# Patient Record
Sex: MALE | Race: WHITE | NOT HISPANIC OR LATINO | Employment: OTHER | URBAN - METROPOLITAN AREA
[De-identification: names, ages, dates, MRNs, and addresses within clinical notes are randomized per-mention and may not be internally consistent; named-entity substitution may affect disease eponyms.]

---

## 2017-04-06 ENCOUNTER — OFFICE VISIT - RIVER FALLS (OUTPATIENT)
Dept: FAMILY MEDICINE | Facility: CLINIC | Age: 62
End: 2017-04-06

## 2017-06-26 ENCOUNTER — AMBULATORY - RIVER FALLS (OUTPATIENT)
Dept: FAMILY MEDICINE | Facility: CLINIC | Age: 62
End: 2017-06-26

## 2017-06-27 LAB — PSA SERPL-MCNC: <0.1 NG/ML

## 2017-07-27 ENCOUNTER — RECORDS - HEALTHEAST (OUTPATIENT)
Dept: LAB | Facility: CLINIC | Age: 62
End: 2017-07-27

## 2017-07-27 LAB
CHOLEST SERPL-MCNC: 174 MG/DL
FASTING STATUS PATIENT QL REPORTED: YES
HDLC SERPL-MCNC: 65 MG/DL
LDLC SERPL CALC-MCNC: 101 MG/DL
TRIGL SERPL-MCNC: 39 MG/DL

## 2017-09-26 ENCOUNTER — AMBULATORY - RIVER FALLS (OUTPATIENT)
Dept: FAMILY MEDICINE | Facility: CLINIC | Age: 62
End: 2017-09-26

## 2017-09-26 ENCOUNTER — COMMUNICATION - RIVER FALLS (OUTPATIENT)
Dept: FAMILY MEDICINE | Facility: CLINIC | Age: 62
End: 2017-09-26

## 2017-09-27 LAB — PSA SERPL-MCNC: <0.1 NG/ML

## 2017-10-16 ENCOUNTER — OFFICE VISIT - RIVER FALLS (OUTPATIENT)
Dept: FAMILY MEDICINE | Facility: CLINIC | Age: 62
End: 2017-10-16

## 2017-11-30 ASSESSMENT — MIFFLIN-ST. JEOR: SCORE: 1464.39

## 2017-12-03 ENCOUNTER — ANESTHESIA - HEALTHEAST (OUTPATIENT)
Dept: SURGERY | Facility: CLINIC | Age: 62
End: 2017-12-03

## 2017-12-04 ENCOUNTER — SURGERY - HEALTHEAST (OUTPATIENT)
Dept: SURGERY | Facility: CLINIC | Age: 62
End: 2017-12-04

## 2018-04-26 ENCOUNTER — AMBULATORY - RIVER FALLS (OUTPATIENT)
Dept: FAMILY MEDICINE | Facility: CLINIC | Age: 63
End: 2018-04-26

## 2018-04-27 LAB — PSA SERPL-MCNC: <0.1 NG/ML

## 2018-08-28 ENCOUNTER — RECORDS - HEALTHEAST (OUTPATIENT)
Dept: LAB | Facility: CLINIC | Age: 63
End: 2018-08-28

## 2018-08-28 LAB
ALBUMIN SERPL-MCNC: 3.9 G/DL (ref 3.5–5)
ALP SERPL-CCNC: 91 U/L (ref 45–120)
ALT SERPL W P-5'-P-CCNC: 20 U/L (ref 0–45)
ANION GAP SERPL CALCULATED.3IONS-SCNC: 8 MMOL/L (ref 5–18)
AST SERPL W P-5'-P-CCNC: 21 U/L (ref 0–40)
BILIRUB SERPL-MCNC: 0.7 MG/DL (ref 0–1)
BUN SERPL-MCNC: 11 MG/DL (ref 8–22)
CALCIUM SERPL-MCNC: 10.1 MG/DL (ref 8.5–10.5)
CHLORIDE BLD-SCNC: 101 MMOL/L (ref 98–107)
CHOLEST SERPL-MCNC: 171 MG/DL
CO2 SERPL-SCNC: 27 MMOL/L (ref 22–31)
CREAT SERPL-MCNC: 0.72 MG/DL (ref 0.7–1.3)
FASTING STATUS PATIENT QL REPORTED: YES
GFR SERPL CREATININE-BSD FRML MDRD: >60 ML/MIN/1.73M2
GLUCOSE BLD-MCNC: 100 MG/DL (ref 70–125)
HDLC SERPL-MCNC: 69 MG/DL
LDLC SERPL CALC-MCNC: 91 MG/DL
POTASSIUM BLD-SCNC: 4.9 MMOL/L (ref 3.5–5)
PROT SERPL-MCNC: 7.9 G/DL (ref 6–8)
SODIUM SERPL-SCNC: 136 MMOL/L (ref 136–145)
TRIGL SERPL-MCNC: 57 MG/DL

## 2018-09-24 ENCOUNTER — OFFICE VISIT - RIVER FALLS (OUTPATIENT)
Dept: FAMILY MEDICINE | Facility: CLINIC | Age: 63
End: 2018-09-24

## 2018-09-25 LAB — PSA SERPL-MCNC: <0.1 NG/ML

## 2018-10-01 ENCOUNTER — COMMUNICATION - RIVER FALLS (OUTPATIENT)
Dept: FAMILY MEDICINE | Facility: CLINIC | Age: 63
End: 2018-10-01

## 2019-04-29 ENCOUNTER — AMBULATORY - RIVER FALLS (OUTPATIENT)
Dept: FAMILY MEDICINE | Facility: CLINIC | Age: 64
End: 2019-04-29

## 2019-04-30 LAB — PSA SERPL-MCNC: <0.1 NG/ML

## 2019-09-10 ENCOUNTER — RECORDS - HEALTHEAST (OUTPATIENT)
Dept: LAB | Facility: CLINIC | Age: 64
End: 2019-09-10

## 2019-09-10 LAB
ALBUMIN SERPL-MCNC: 3.8 G/DL (ref 3.5–5)
ALP SERPL-CCNC: 78 U/L (ref 45–120)
ALT SERPL W P-5'-P-CCNC: 22 U/L (ref 0–45)
ANION GAP SERPL CALCULATED.3IONS-SCNC: 8 MMOL/L (ref 5–18)
AST SERPL W P-5'-P-CCNC: 20 U/L (ref 0–40)
BILIRUB SERPL-MCNC: 0.5 MG/DL (ref 0–1)
BUN SERPL-MCNC: 11 MG/DL (ref 8–22)
CALCIUM SERPL-MCNC: 9.7 MG/DL (ref 8.5–10.5)
CHLORIDE BLD-SCNC: 101 MMOL/L (ref 98–107)
CHOLEST SERPL-MCNC: 176 MG/DL
CO2 SERPL-SCNC: 27 MMOL/L (ref 22–31)
CREAT SERPL-MCNC: 0.77 MG/DL (ref 0.7–1.3)
FASTING STATUS PATIENT QL REPORTED: YES
GFR SERPL CREATININE-BSD FRML MDRD: >60 ML/MIN/1.73M2
GLUCOSE BLD-MCNC: 105 MG/DL (ref 70–125)
HDLC SERPL-MCNC: 75 MG/DL
LDLC SERPL CALC-MCNC: 87 MG/DL
POTASSIUM BLD-SCNC: 4.5 MMOL/L (ref 3.5–5)
PROT SERPL-MCNC: 7.7 G/DL (ref 6–8)
SODIUM SERPL-SCNC: 136 MMOL/L (ref 136–145)
TRIGL SERPL-MCNC: 68 MG/DL

## 2019-09-24 ENCOUNTER — AMBULATORY - RIVER FALLS (OUTPATIENT)
Dept: FAMILY MEDICINE | Facility: CLINIC | Age: 64
End: 2019-09-24

## 2019-09-25 LAB — PSA SERPL-MCNC: <0.1 NG/ML

## 2019-10-07 ENCOUNTER — OFFICE VISIT - RIVER FALLS (OUTPATIENT)
Dept: FAMILY MEDICINE | Facility: CLINIC | Age: 64
End: 2019-10-07

## 2020-04-15 ENCOUNTER — AMBULATORY - RIVER FALLS (OUTPATIENT)
Dept: FAMILY MEDICINE | Facility: CLINIC | Age: 65
End: 2020-04-15

## 2020-04-16 LAB — PSA SERPL-MCNC: <0.1 NG/ML

## 2020-10-08 ENCOUNTER — AMBULATORY - RIVER FALLS (OUTPATIENT)
Dept: FAMILY MEDICINE | Facility: CLINIC | Age: 65
End: 2020-10-08

## 2020-10-09 LAB — PSA SERPL-MCNC: <0.1 NG/ML

## 2020-10-13 ENCOUNTER — RECORDS - HEALTHEAST (OUTPATIENT)
Dept: LAB | Facility: CLINIC | Age: 65
End: 2020-10-13

## 2020-10-13 LAB
ALBUMIN SERPL-MCNC: 3.8 G/DL (ref 3.5–5)
ALP SERPL-CCNC: 83 U/L (ref 45–120)
ALT SERPL W P-5'-P-CCNC: 16 U/L (ref 0–45)
ANION GAP SERPL CALCULATED.3IONS-SCNC: 7 MMOL/L (ref 5–18)
AST SERPL W P-5'-P-CCNC: 17 U/L (ref 0–40)
BILIRUB SERPL-MCNC: 0.5 MG/DL (ref 0–1)
BUN SERPL-MCNC: 11 MG/DL (ref 8–22)
CALCIUM SERPL-MCNC: 9.3 MG/DL (ref 8.5–10.5)
CHLORIDE BLD-SCNC: 101 MMOL/L (ref 98–107)
CHOLEST SERPL-MCNC: 173 MG/DL
CO2 SERPL-SCNC: 28 MMOL/L (ref 22–31)
CREAT SERPL-MCNC: 0.77 MG/DL (ref 0.7–1.3)
FASTING STATUS PATIENT QL REPORTED: YES
GFR SERPL CREATININE-BSD FRML MDRD: >60 ML/MIN/1.73M2
GLUCOSE BLD-MCNC: 105 MG/DL (ref 70–125)
HDLC SERPL-MCNC: 58 MG/DL
LDLC SERPL CALC-MCNC: 96 MG/DL
POTASSIUM BLD-SCNC: 4.3 MMOL/L (ref 3.5–5)
PROT SERPL-MCNC: 7.6 G/DL (ref 6–8)
SODIUM SERPL-SCNC: 136 MMOL/L (ref 136–145)
TRIGL SERPL-MCNC: 97 MG/DL

## 2020-11-02 ENCOUNTER — OFFICE VISIT - RIVER FALLS (OUTPATIENT)
Dept: FAMILY MEDICINE | Facility: CLINIC | Age: 65
End: 2020-11-02

## 2020-11-06 ENCOUNTER — OFFICE VISIT - RIVER FALLS (OUTPATIENT)
Dept: FAMILY MEDICINE | Facility: CLINIC | Age: 65
End: 2020-11-06

## 2020-11-06 ENCOUNTER — AMBULATORY - RIVER FALLS (OUTPATIENT)
Dept: FAMILY MEDICINE | Facility: CLINIC | Age: 65
End: 2020-11-06

## 2021-05-30 ENCOUNTER — RECORDS - HEALTHEAST (OUTPATIENT)
Dept: ADMINISTRATIVE | Facility: CLINIC | Age: 66
End: 2021-05-30

## 2021-05-31 VITALS — WEIGHT: 160 LBS | HEIGHT: 67 IN | BODY MASS INDEX: 25.11 KG/M2

## 2021-06-14 NOTE — ANESTHESIA PREPROCEDURE EVALUATION
Anesthesia Evaluation      Patient summary reviewed   No history of anesthetic complications     Airway   Mallampati: II  Neck ROM: full   Pulmonary - negative ROS and normal exam                          Cardiovascular - negative ROS  Exercise tolerance: > or = 4 METS  Rhythm: regular  Rate: normal,         Neuro/Psych - negative ROS     Endo/Other - negative ROS      GI/Hepatic/Renal - negative ROS           Dental - normal exam                        Anesthesia Plan  Planned anesthetic: general LMA    ASA 1   Induction: intravenous   Anesthetic plan and risks discussed with: patient    Post-op plan: routine recovery

## 2021-06-14 NOTE — ANESTHESIA CARE TRANSFER NOTE
Last vitals:   Vitals:    12/04/17 1305   BP: 129/74   Pulse: 88   Resp: 14   Temp: 36.6  C (97.8  F)   SpO2: 99%     Patient's level of consciousness is drowsy  Spontaneous respirations: yes  Maintains airway independently: yes  Dentition unchanged: yes  Oropharynx: oropharynx clear of all foreign objects    QCDR Measures:  ASA# 20 - Surgical Safety Checklist: WHO surgical safety checklist completed prior to induction  PQRS# 430 - Adult PONV Prevention: 4558F - Pt received => 2 anti-emetic agents (different classes) preop & intraop  ASA# 8 - Peds PONV Prevention: NA - Not pediatric patient, not GA or 2 or more risk factors NOT present  PQRS# 424 - Stacia-op Temp Management: 4559F - At least one body temp DOCUMENTED => 35.5C or 95.9F within required timeframe  PQRS# 426 - PACU Transfer Protocol: - Transfer of care checklist used  ASA# 14 - Acute Post-op Pain: ASA14B - Patient did NOT experience pain >= 7 out of 10

## 2021-06-14 NOTE — ANESTHESIA POSTPROCEDURE EVALUATION
Patient: Juan Valadez  REPAIR LEFT INGUINAL HERNIA WITH MESH, REPAIR UMBILICAL HERNIA  Anesthesia type: general    Patient location: PACU  Last vitals:   Vitals:    12/04/17 1440   BP: 142/82   Pulse: 60   Resp: 16   Temp: 36.8  C (98.2  F)   SpO2: 98%     Post vital signs: stable  Level of consciousness: awake and responds to simple questions  Post-anesthesia pain: pain controlled  Post-anesthesia nausea and vomiting: no  Pulmonary: unassisted, return to baseline  Cardiovascular: stable and blood pressure at baseline  Hydration: adequate  Anesthetic events: no    QCDR Measures:  ASA# 11 - Stacia-op Cardiac Arrest: ASA11B - Patient did NOT experience unanticipated cardiac arrest  ASA# 12 - Stacia-op Mortality Rate: ASA12B - Patient did NOT die  ASA# 13 - PACU Re-Intubation Rate: ASA13B - Patient did NOT require a new airway mgmt  ASA# 10 - Composite Anes Safety: ASA10A - No serious adverse event    Additional Notes:

## 2021-09-30 ENCOUNTER — OFFICE VISIT - RIVER FALLS (OUTPATIENT)
Dept: FAMILY MEDICINE | Facility: CLINIC | Age: 66
End: 2021-09-30

## 2021-09-30 ASSESSMENT — MIFFLIN-ST. JEOR: SCORE: 1456.21

## 2021-10-01 ENCOUNTER — COMMUNICATION - RIVER FALLS (OUTPATIENT)
Dept: FAMILY MEDICINE | Facility: CLINIC | Age: 66
End: 2021-10-01

## 2021-10-01 LAB
CHOLEST SERPL-MCNC: 195 MG/DL
CHOLEST/HDLC SERPL: 2.6 {RATIO}
GLUCOSE BLD-MCNC: 104 MG/DL (ref 65–99)
HDLC SERPL-MCNC: 74 MG/DL
LDLC SERPL CALC-MCNC: 103 MG/DL
NONHDLC SERPL-MCNC: 121 MG/DL
TRIGL SERPL-MCNC: 88 MG/DL

## 2021-12-15 ENCOUNTER — OFFICE VISIT - RIVER FALLS (OUTPATIENT)
Dept: FAMILY MEDICINE | Facility: CLINIC | Age: 66
End: 2021-12-15

## 2021-12-15 ASSESSMENT — MIFFLIN-ST. JEOR: SCORE: 1466.64

## 2022-02-11 VITALS
BODY MASS INDEX: 26.16 KG/M2 | SYSTOLIC BLOOD PRESSURE: 143 MMHG | TEMPERATURE: 96.4 F | HEIGHT: 66 IN | HEART RATE: 55 BPM | DIASTOLIC BLOOD PRESSURE: 81 MMHG | WEIGHT: 162.8 LBS

## 2022-02-11 VITALS — SYSTOLIC BLOOD PRESSURE: 130 MMHG | HEART RATE: 67 BPM | DIASTOLIC BLOOD PRESSURE: 76 MMHG

## 2022-02-11 VITALS
DIASTOLIC BLOOD PRESSURE: 75 MMHG | BODY MASS INDEX: 26.53 KG/M2 | WEIGHT: 165.1 LBS | HEART RATE: 73 BPM | HEIGHT: 66 IN | TEMPERATURE: 96.9 F | SYSTOLIC BLOOD PRESSURE: 136 MMHG

## 2022-02-16 NOTE — NURSING NOTE
Depression Screening Entered On:  9/30/2021 12:52 PM CDT    Performed On:  9/30/2021 12:52 PM CDT by Bradley STONE, Maxine               Depression Screening   Little Interest - Pleasure in Activities :   Not at all   Feeling Down, Depressed, Hopeless :   Not at all   Initial Depression Screen Score :   0 Score   Poor Appetite or Overeating :   Not at all   Trouble Falling or Staying Asleep :   Several days   Feeling Tired or Little Energy :   Not at all   Feeling Bad About Yourself :   Not at all   Trouble Concentrating :   Several days   Moving or Speaking Slowly :   Not at all   Thoughts Better Off Dead or Hurting Self :   Not at all   Difficulty at Work, Home, Getting Along :   Not difficult at all   Detailed Depression Screen Score :   2    Total Depression Screen Score :   2    Maxine Huerta MA - 9/30/2021 12:52 PM CDT

## 2022-02-16 NOTE — NURSING NOTE
Comprehensive Intake Entered On:  9/30/2021 11:13 AM CDT    Performed On:  9/30/2021 11:07 AM CDT by Maxine Huerta MA               Summary   Chief Complaint :   establish care.  also discuss periodic anxiety.  flu shot also.   Weight Measured :   162.8 lb(Converted to: 162 lb 13 oz, 73.845 kg)    Height Measured :   66 in(Converted to: 5 ft 6 in, 167.64 cm)    Body Mass Index :   26.27 kg/m2 (HI)    Body Surface Area :   1.85 m2   Systolic Blood Pressure :   143 mmHg (HI)    Diastolic Blood Pressure :   81 mmHg (HI)    Mean Arterial Pressure :   102 mmHg   Peripheral Pulse Rate :   55 bpm (LOW)    BP Site :   Right arm   Pulse Site :   Radial artery   BP Method :   Electronic   HR Method :   Electronic   Temperature Tympanic :   96.4 DegF(Converted to: 35.8 DegC)  (LOW)    Maxine Huerta MA - 9/30/2021 11:07 AM CDT   Health Status   Allergies Verified? :   Yes   Medication History Verified? :   Yes   Medical History Verified? :   Yes   Pre-Visit Planning Status :   Completed   Maxine Huerta MA - 9/30/2021 11:07 AM CDT   Consents   Consent for Immunization Exchange :   Consent Granted   Consent for Immunizations to Providers :   Consent Granted   Maxine Huerta MA - 9/30/2021 11:07 AM CDT   Meds / Allergies   (As Of: 9/30/2021 11:13:34 AM CDT)   Allergies (Active)   No Known Medication Allergies  Estimated Onset Date:   Unspecified ; Created By:   Ana Maria Webster CMA; Reaction Status:   Active ; Category:   Drug ; Substance:   No Known Medication Allergies ; Type:   Allergy ; Updated By:   Ana Maria Webster CMA; Reviewed Date:   11/6/2020 12:08 PM CST        Medication List   (As Of: 9/30/2021 11:13:34 AM CDT)   Home Meds    ascorbic acid  :   ascorbic acid ; Status:   Documented ; Ordered As Mnemonic:   Vitamin C ; Simple Display Line:   daily, 0 Refill(s) ; Catalog Code:   ascorbic acid ; Order Dt/Tm:   11/6/2020 12:19:17 PM CST          atorvastatin  :   atorvastatin ; Status:   Documented ; Ordered As  Mnemonic:   atorvastatin 40 mg oral tablet ; Simple Display Line:   0 Refill(s) ; Catalog Code:   atorvastatin ; Order Dt/Tm:   9/29/2021 3:45:01 PM CDT          cholecalciferol  :   cholecalciferol ; Status:   Documented ; Ordered As Mnemonic:   Vitamin D3 ; Simple Display Line:   daily, 0 Refill(s) ; Catalog Code:   cholecalciferol ; Order Dt/Tm:   9/30/2021 11:11:27 AM CDT          multivitamin with minerals  :   multivitamin with minerals ; Status:   Documented ; Ordered As Mnemonic:   Daily Multi oral tablet ; Simple Display Line:   Oral, daily, 0 Refill(s) ; Catalog Code:   multivitamin with minerals ; Order Dt/Tm:   11/6/2020 12:18:58 PM CST          sildenafil  :   sildenafil ; Status:   Documented ; Ordered As Mnemonic:   sildenafil 20 mg oral tablet ; Simple Display Line:   20 mg, 1 tab(s), Oral, tid, 0 Refill(s) ; Catalog Code:   sildenafil ; Order Dt/Tm:   9/30/2021 11:11:06 AM CDT          ubiquinone  :   ubiquinone ; Status:   Documented ; Ordered As Mnemonic:   Co Q-10 ; Simple Display Line:   100 mg, Oral, daily, 0 Refill(s) ; Catalog Code:   ubiquinone ; Order Dt/Tm:   11/6/2020 12:19:05 PM CST          zinc sulfate  :   zinc sulfate ; Status:   Documented ; Ordered As Mnemonic:   Zinc ; Simple Display Line:   140 mg, Oral, daily, 0 Refill(s) ; Catalog Code:   zinc sulfate ; Order Dt/Tm:   11/6/2020 12:19:11 PM CST            Social History   Social History   (As Of: 9/30/2021 11:13:34 AM CDT)   Tobacco:        Never (less than 100 in lifetime)   (Last Updated: 9/30/2021 11:12:02 AM CDT by Maxine Huerta MA)          Electronic Cigarette/Vaping:        Electronic Cigarette Use: Never.   (Last Updated: 9/30/2021 11:12:07 AM CDT by Maxine Huerta MA)          Substance Abuse:        Never   (Last Updated: 9/30/2021 11:12:12 AM CDT by Maxine Huerta MA)

## 2022-02-16 NOTE — NURSING NOTE
Vital Signs Entered On:  10/8/2020 10:10 AM CDT    Performed On:  10/8/2020 10:10 AM CDT by Sarah Delgadillo RN               Vital Signs   Systolic Blood Pressure :   130 mmHg   Diastolic Blood Pressure :   76 mmHg   Mean Arterial Pressure :   94 mmHg   BP Site :   Right arm   Peripheral Pulse Rate :   67 bpm   Sarah Delgadillo RN - 10/8/2020 10:10 AM CDT

## 2022-02-16 NOTE — TELEPHONE ENCOUNTER
---------------------  From: Joanna Maldonado MD   Sent: 11/12/2020 7:56:33 AM CST  Subject: General Message     Called patient with negative covid testing.  He should remain quarantined for 2 more days due to exposure 10/31  Joanna Maldonado MD

## 2022-02-16 NOTE — PROGRESS NOTES
Chief Complaint    establish care.  also discuss periodic anxiety.  flu shot also.  History of Present Illness      General health status:  good      Diet:  heart healthy      Exercise:  walking, elliptical, weights      Medical encounters:  none      Dental exam:  in last three months      Eye exam:  in last 6 months      Caffeine use:  occasional      Tobacco use:  no      Alcohol use:  weekly      Lipid and diabetes screening:  due      Colon cancer screening:  sees [facility], due in a couple years      Prostate cancer screening:  had prostate cancer, PSA nondetectable      Other concerns: Intermittent anxiety attacks that he has been able to control with self-centering and relaxation      Chronic disease:  HLD,  BP 130s/80s  Review of Systems          Constitutional:  No fever, No chills, No sweats, No weakness, No fatigue.            Eye:  No recent visual problem.            Ear/Nose/Mouth/Throat:  No decreased hearing, No nasal congestion, No sore throat.            Respiratory:  No shortness of breath, No cough.            Cardiovascular:  Negative, No chest pain, No palpitations, No peripheral edema.            Gastrointestinal:  No nausea, No vomiting, No diarrhea, No constipation, No heartburn.            Genitourinary:  No dysuria, No change in urine stream.            Hematology/Lymphatics:  No bruising tendency, No bleeding tendency.            Endocrine:  No cold intolerance, No heat intolerance.            Immunologic:  Negative.            Musculoskeletal:  No back pain, No neck pain, No joint pain, No muscle pain.            Integumentary:  No rash, No dryness, No skin lesion.            Neurologic:  Alert and oriented X4, No headache.                Psychiatric:  anxiety, No depression  Physical Exam   Vitals & Measurements    T: 96.4  F (Tympanic)  HR: 55 (Peripheral)  BP: 143/81     HT: 66 in  WT: 162.8 lb  BMI: 26.27           General:  Alert and oriented, No acute distress.            Eye:  Pupils  are equal, round and reactive to light, Extraocular movements are intact, Normal conjunctiva.            HENT:  Normocephalic, Tympanic membranes are clear, Oral mucosa is moist, No pharyngeal erythema, No sinus tenderness.            Neck:  Supple, Non-tender, No carotid bruit, No lymphadenopathy, No thyromegaly.            Respiratory:  Lungs are clear to auscultation, Respirations are non-labored, Breath sounds are equal, No chest wall tenderness.            Cardiovascular:  Normal rate, Regular rhythm, No murmur, No gallop, Good pulses equal in all extremities, Normal peripheral perfusion, No edema.            Gastrointestinal:  Soft, Non-tender, Non-distended, Normal bowel sounds, No organomegaly.            Genitourinary:  No CVA tenderness          Lymphatics:  WNL.            Musculoskeletal:  Normal range of motion, Normal strength, No tenderness, No swelling, No deformity.            Integumentary:  Warm, Dry, No rash.            Neurologic:  Alert, Oriented, Normal sensory, Normal motor function, No focal deficits.            Psychiatric:  Cooperative, Appropriate mood & affect.   Assessment/Plan       1. Encounter to establish care (Z76.89)          Patient presents to establish care.  We discussed preventive services.  I have reviewed previous records showing adequate control of his underlying health problems.  He is due for repeat colonoscopy next year.                2. Hyperlipidemia (E78.5)          Hyperlipidemia under control, repeat lipid panel today, continue current medication         Ordered:          Glucose* ([facility]), Specimen Type: Serum, Collection Date: 09/30/21 12:07:00 CDT          Lipid panel with reflex to direct ldl* ([facility]), Specimen Type: Serum, Collection Date: 09/30/21 12:07:00 CDT                3. Anxiety attack (F41.0)          He reports intermittent mild anxiety attacks.  He finds these occur at times when he has more than usual on his schedule.  He has been able to abort  these attacks with relaxation.  He inquires about treatment.  I have advised that he continue with his current relaxation therapy.  If symptoms become more prominent or frequent we can consider treatment at that time.                4. Encounter for immunization (Z23)         Ordered:          influenza virus vaccine, inactivated, 0.7 mL, IM, once, (Completed)          03791 imadm prq id subq/im njxs 1 vaccine (Charge), Quantity: 1, Encounter for immunization          79626 influenza vaccine splt prsrv free inc antigen im (Charge), Quantity: 1, Encounter for immunization                Orders:         atorvastatin, = 1 tab(s) ( 40 mg ), Oral, daily, # 90 tab(s), 3 Refill(s), Type: Maintenance, Pharmacy: "[id number], 1 tab(s) Oral daily,x90 day(s), 66, in, 09/30/21 11:07:00 CDT, Height Measured, 162.8, lb, 09/30/21 11:07:00 CDT, Weight Measured, (Ordered)  Patient Information     Name:JUAN SANDERS      Address:      [address] [place] [address]       [place], [place] [id number]     Sex:Male     YOB: 1955     [phone]     Emergency Contact:YUDI SANDERS     MRN:[id number]     FIN:[id number]     Location:[facility]     Date of Service:09/30/2021      Primary Care Physician:       Juan Jasmine MD, [phone]      Attending Physician:       Juan Jasmine MD, [phone]  Problem List/Past Medical History    Ongoing     CA of prostate     Cervical spinal stenosis     Chronic low back pain     Dupuytren's contracture     Elevated PSA     Family history of ischemic heart disease     History of prostate cancer     Hyperlipidemia    Historical     No qualifying data  Procedure/Surgical History     Repair of inguinal hernia (12/04/2017)      Comments: And umbilical hernia repair..     Radical retropubic prostatectomy (09/29/2015)      Comments: DaVinci procedure. Bilateral pelvic lymph node dissection and right ureteral stent placement..     Rectal biopsy (2000)       Comments: Negative.     Appendectomy  Medications    atorvastatin 40 mg oral tablet, 40 mg= 1 tab(s), Oral, daily, 3 refills    Co Q-10, 100 mg, Oral, daily    Daily Multi oral tablet, Oral, daily    sildenafil 20 mg oral tablet, 20 mg= 1 tab(s), Oral, tid    Vitamin C, daily    Vitamin D3, daily    Zinc, 140 mg, Oral, daily  Allergies    No Known Medication Allergies  Social History    Smoking Status     Never smoker     Alcohol      Current, Beer (12 oz), 1-2 times per week     Electronic Cigarette/Vaping      Electronic Cigarette Use: Never.     Employment/School      Retired     Exercise      Exercise frequency: 3-4 times/week. Exercise type: Weight lifting, Cardio.     Home/Environment      Marital status: . Spouse/Partner name: Denisse. Lives with Spouse.     Nutrition/Health      Type of diet: Regular.     Sexual      Sexual orientation: Straight or heterosexual.     Substance Abuse      Never     Tobacco      Never (less than 100 in lifetime)  Family History    Alcohol abuse: Mother.    Brain tumor: Granddaughter.    CA - Cancer of colon: Father.    CA - Cancer of pancreas: Sister.    Coronary artery disease: Father.    Dementia: Mother.    Heart disease: Mother.    Mental illness: Mother.  Immunizations          Scheduled Immunizations          Dose Date(s)          influenza virus vaccine, inactivated          10/17/2018, 10/14/2019, 10/06/2020          SARS-CoV-2 (COVID-19) Moderna-1273          04/07/2021, 05/12/2021          Td          05/22/1999          tetanus/diphth/pertuss (Tdap) adult/adol          05/20/2008          ZOS, shingles          02/26/2013          Other Immunizations          influenza virus vaccine, inactivated          09/30/2021          pneumococcal (PPSV23)          10/13/2020          tetanus/diphth/pertuss (Tdap) adult/adol          08/28/2018          pneumococcal (PCV13)          09/10/2019

## 2022-02-16 NOTE — PROGRESS NOTES
Chief Complaint    Verbal consent given for telephone visit. With grandson over weekend. Grandson tested positive today. Pt is asymptomatic. Never tested for COVID.  History of Present Illness       Today's visit was conducted via telephone due to the COVID-19 pandemic. Patient's consent to telephone visit was obtained and documented.       Call Start Time:   12:41pm       Call End Time:    12:57pm       patient at home - [place], [place]       physician in clinic, [place], [place]       Patient is a 65-year-old male on the telephone today for concern about coronavirus exposure.       On October 31 he was exposed to his grandson.  They were outside in the garage but were not 6 feet apart the whole time.  They did not wear masks.  His grandson was not symptomatic when they were together and has remained asymptomatic.  The next day the grandson had found out that his girlfriend tested positive for coronavirus.  Grandson was swabbed either November 2 or 3 and just found out yesterday that his coronavirus testing came back positive.  Again grandson remains asymptomatic.       Patient denies any symptoms.       Specifically he denies fever, chills.       No sore throat, rhinitis, headache.       No myalgias or fatigue.       No cough or shortness of breath.       No abdominal pain, nausea, vomiting, diarrhea.       No change in taste or smell.       Lives with his wife who was also exposed.  She also is not exhibiting any symptoms.       He is taking zinc, vitamin C, a multivitamin and co-Q10.  Review of Systems       Negative except as listed in HPI          Physical Exam       Telemedicine visit          Assessment/Plan       Close exposure to 2019 novel coronavirus (Z20.828)          With this positive exposure we will get him tested for coronavirus.  He will need to remain quarantined through 14 days past his last exposure with his grandson.         He is about 7 days post exposure which is in a good window to test  someone who is asymptomatic but has a positive exposure.         Described to the [facility] testing protocol to him.         Advised him that it is taking 4 to 7 days for our results to come back.         Ordered:          SARS-CoV-2 RNA (COVID-19), Qualitative NAAT* ([facility]), Specimen Type: Anterior Nares, Collection Date: 11/06/20 12:55:00 CST           Patient Information     Name:LIBIA SANDERS      Address:      [address] [place] [address] [address], [place] [id number]     Sex:Male     YOB: 1955     [phone]     Emergency Contact:YUDI SANDERS     MRN:[id number]     FIN:[id number]     Location:[facility]     Date of Service:11/06/2020      Primary Care Physician:       NONE ,       Attending Physician:       Joanna Maldonado MD, [phone]  Problem List/Past Medical History    Ongoing     CA of prostate     Elevated PSA     History of prostate cancer    Historical     No qualifying data  Medications    Co Q-10, 100 mg, Oral, daily    Daily Multi oral tablet, Oral, daily    Vitamin C, daily    Zinc, 140 mg, Oral, daily  Allergies    No Known Medication Allergies  Social History    Smoking Status     Never smoker  Lab Results       Lab Results (Last 4 results within 90 days)        PSA: <0.1 (10/08/20 09:56:00)

## 2022-02-16 NOTE — NURSING NOTE
Comprehensive Intake Entered On:  11/6/2020 12:12 PM CST    Performed On:  11/6/2020 12:05 PM CST by Ana Maria Webster CMA               Summary   Chief Complaint :   Verbal consent given for telephone visit. With grandson over weekend. Grandson tested positive today. Pt is asymptomatic. Never tested for COVID.    Ana Maria Webster CMA - 11/6/2020 12:16 PM CST   Health Status   Allergies Verified? :   Yes   Medication History Verified? :   Yes   Pre-Visit Planning Status :   Not completed   Tobacco Use? :   Never smoker   Ana Maria Webster CMA - 11/6/2020 12:05 PM CST   Meds / Allergies   (As Of: 11/6/2020 12:19:34 PM CST)   Allergies (Active)   No Known Medication Allergies  Estimated Onset Date:   Unspecified ; Created By:   Ana Maria Webster CMA; Reaction Status:   Active ; Category:   Drug ; Substance:   No Known Medication Allergies ; Type:   Allergy ; Updated By:   Ana Maria Webster CMA; Reviewed Date:   11/6/2020 12:08 PM CST        Medication List   (As Of: 11/6/2020 12:19:34 PM CST)   Home Meds    ascorbic acid  :   ascorbic acid ; Status:   Documented ; Ordered As Mnemonic:   Vitamin C ; Simple Display Line:   daily, 0 Refill(s) ; Catalog Code:   ascorbic acid ; Order Dt/Tm:   11/6/2020 12:19:17 PM CST          multivitamin with minerals  :   multivitamin with minerals ; Status:   Documented ; Ordered As Mnemonic:   Daily Multi oral tablet ; Simple Display Line:   Oral, daily, 0 Refill(s) ; Catalog Code:   multivitamin with minerals ; Order Dt/Tm:   11/6/2020 12:18:58 PM CST          ubiquinone  :   ubiquinone ; Status:   Documented ; Ordered As Mnemonic:   Co Q-10 ; Simple Display Line:   100 mg, Oral, daily, 0 Refill(s) ; Catalog Code:   ubiquinone ; Order Dt/Tm:   11/6/2020 12:19:05 PM CST          zinc sulfate  :   zinc sulfate ; Status:   Documented ; Ordered As Mnemonic:   Zinc ; Simple Display Line:   140 mg, Oral, daily, 0 Refill(s) ; Catalog Code:   zinc sulfate ; Order Dt/Tm:   11/6/2020 12:19:11 PM CST             ID Risk Screen   Recent Travel History :   No recent travel   Family Member Travel History :   No recent travel   Other Exposure to Infectious Disease :   Community exposure to COVID-19 within the last 14 days   Ana Maria Webster CMA - 11/6/2020 12:05 PM CST

## 2022-02-16 NOTE — NURSING NOTE
Comprehensive Intake Entered On:  12/15/2021 9:48 AM CST    Performed On:  12/15/2021 9:44 AM CST by Bradley STONE, Maxine               Summary   Chief Complaint :   check ears for wax/cerumen.  also COVID booster   Weight Measured :   165.1 lb(Converted to: 165 lb 2 oz, 74.888 kg)    Height Measured :   66 in(Converted to: 5 ft 6 in, 167.64 cm)    Body Mass Index :   26.64 kg/m2 (HI)    Body Surface Area :   1.87 m2   Systolic Blood Pressure :   136 mmHg (HI)    Diastolic Blood Pressure :   75 mmHg   Mean Arterial Pressure :   95 mmHg   Peripheral Pulse Rate :   73 bpm   BP Site :   Right arm   Pulse Site :   Brachial artery   BP Method :   Electronic   HR Method :   Electronic   Temperature Tympanic :   96.9 DegF(Converted to: 36.1 DegC)  (LOW)    Maxine Huerta MA - 12/15/2021 9:44 AM CST   Health Status   Allergies Verified? :   Yes   Medication History Verified? :   Yes   Medical History Verified? :   Yes   Pre-Visit Planning Status :   Completed   Tobacco Use? :   Never smoker   Maxine Huerta MA - 12/15/2021 9:44 AM CST   Consents   Consent for Immunization Exchange :   Consent Granted   Consent for Immunizations to Providers :   Consent Granted   Maxine Huerta MA - 12/15/2021 9:44 AM CST   Meds / Allergies   (As Of: 12/15/2021 9:48:23 AM CST)   Allergies (Active)   No Known Medication Allergies  Estimated Onset Date:   Unspecified ; Created By:   Ana Maria Webster CMA; Reaction Status:   Active ; Category:   Drug ; Substance:   No Known Medication Allergies ; Type:   Allergy ; Updated By:   Ana Maria Webster CMA; Reviewed Date:   11/6/2020 12:08 PM CST        Medication List   (As Of: 12/15/2021 9:48:23 AM CST)   Prescription/Discharge Order    atorvastatin  :   atorvastatin ; Status:   Prescribed ; Ordered As Mnemonic:   atorvastatin 40 mg oral tablet ; Simple Display Line:   40 mg, 1 tab(s), Oral, daily, for 90 day(s), 90 tab(s), 3 Refill(s) ; Ordering Provider:   Juan Jasmine MD; Catalog Code:    atorvastatin ; Order Dt/Tm:   9/30/2021 12:02:04 PM CDT            Home Meds    ascorbic acid  :   ascorbic acid ; Status:   Documented ; Ordered As Mnemonic:   Vitamin C ; Simple Display Line:   daily, 0 Refill(s) ; Catalog Code:   ascorbic acid ; Order Dt/Tm:   11/6/2020 12:19:17 PM CST          cholecalciferol  :   cholecalciferol ; Status:   Documented ; Ordered As Mnemonic:   Vitamin D3 ; Simple Display Line:   daily, 0 Refill(s) ; Catalog Code:   cholecalciferol ; Order Dt/Tm:   9/30/2021 11:11:27 AM CDT          multivitamin with minerals  :   multivitamin with minerals ; Status:   Documented ; Ordered As Mnemonic:   Daily Multi oral tablet ; Simple Display Line:   Oral, daily, 0 Refill(s) ; Catalog Code:   multivitamin with minerals ; Order Dt/Tm:   11/6/2020 12:18:58 PM CST          sildenafil  :   sildenafil ; Status:   Documented ; Ordered As Mnemonic:   sildenafil 20 mg oral tablet ; Simple Display Line:   20 mg, 1 tab(s), Oral, tid, 0 Refill(s) ; Catalog Code:   sildenafil ; Order Dt/Tm:   9/30/2021 11:11:06 AM CDT          ubiquinone  :   ubiquinone ; Status:   Documented ; Ordered As Mnemonic:   Co Q-10 ; Simple Display Line:   100 mg, Oral, daily, 0 Refill(s) ; Catalog Code:   ubiquinone ; Order Dt/Tm:   11/6/2020 12:19:05 PM CST          zinc sulfate  :   zinc sulfate ; Status:   Documented ; Ordered As Mnemonic:   Zinc ; Simple Display Line:   140 mg, Oral, daily, 0 Refill(s) ; Catalog Code:   zinc sulfate ; Order Dt/Tm:   11/6/2020 12:19:11 PM CST            Social History   Social History   (As Of: 12/15/2021 9:48:23 AM CST)   Alcohol:        Current, Beer (12 oz), 1-2 times per week   (Last Updated: 9/30/2021 12:55:22 PM CDT by Maxine Huerta MA)          Tobacco:        Never (less than 100 in lifetime)   (Last Updated: 9/30/2021 11:12:02 AM CDT by Maxine Huerta MA)          Electronic Cigarette/Vaping:        Electronic Cigarette Use: Never.   (Last Updated: 9/30/2021 11:12:07 AM CDT by  Maxine Huerta MA)          Substance Abuse:        Never   (Last Updated: 9/30/2021 11:12:12 AM CDT by Maxine Huerta MA)          Employment/School:        Retired   (Last Updated: 9/30/2021 12:55:30 PM CDT by Maxine Huerta MA)          Home/Environment:        Marital status: .  Spouse/Partner name: Denisse.  Lives with Spouse.   (Last Updated: 9/30/2021 12:55:49 PM CDT by Maxine Huerta MA)          Nutrition/Health:        Type of diet: Regular.   (Last Updated: 9/30/2021 12:55:57 PM CDT by Maxine Huerta MA)          Exercise:        Exercise frequency: 3-4 times/week.  Exercise type: Weight lifting, Cardio.   (Last Updated: 9/30/2021 12:56:15 PM CDT by Maxine Huerta MA)          Sexual:        Sexual orientation: Straight or heterosexual.   (Last Updated: 9/30/2021 12:56:24 PM CDT by Maxine Huerta MA)

## 2022-02-16 NOTE — TELEPHONE ENCOUNTER
---------------------  From: Juan Jasmine MD   To: JUAN SANDERS    Sent: 10/1/2021 8:52:59 AM CDT  Subject: Patient Message - Results Notification      Your results look good and are comparable to previous numbers.      Results:  Date Result Name Ind Value Ref Range   9/30/2021 12:14 PM Cholesterol  195 mg/dL ( - <200)   9/30/2021 12:14 PM HDL  74 mg/dL (> OR = 40 - )   9/30/2021 12:14 PM Triglyceride  88 mg/dL ( - <150)   9/30/2021 12:14 PM LDL ((H)) 103    9/30/2021 12:14 PM Cholesterol/HDL Ratio  2.6 ( - <5.0)   9/30/2021 12:14 PM Non-HDL Cholesterol  121 ( - <130)   9/30/2021 12:14 PM Glucose Level ((H)) 104 mg/dL (65 - 99)

## 2022-02-16 NOTE — TELEPHONE ENCOUNTER
---------------------  From: Joanna Maldonado MD   To: [facility] ([id number]);     Sent: 2020 12:57:06 PM CST  Subject: General Message     Please schedule patient for curbside testing today. Please schedule with wife, Deanne somers 1956  Thank you!  Liudmila Morrison pt's scheduled

## 2022-02-16 NOTE — TELEPHONE ENCOUNTER
---------------------  From: Cintia Clements LPN (Phone Messages Pool ([id number]_[facility]))   Sent: 11/11/2020 12:19:24 PM CST  Subject: results     Phone Message    PCP:   _      Time of Call:  11:40am       Person Calling:  pt  Phone number:  [phone] OK to LM     Returned call at: 12:17pm    Note:   Pt LM stating he was tested 11/6 for covid and has not gotten results. Returned call and LM letting pt know results are not back yet and we will contact him once they come back.    Last office visit and reason:  11/6/20 covid exposure with [facility]

## 2022-02-16 NOTE — PROGRESS NOTES
Chief Complaint    check ears for wax/cerumen.  also COVID booster  History of Present Illness      Freeman is here with decreased hearing and concern about excess cerumen.  He has had this problem in the past.      He is otherwise feeling well  Review of Systems          ROS reviewed and negative except for symptoms noted in HPI.  Physical Exam   Vitals & Measurements    T: 96.9  F (Tympanic)  HR: 73 (Peripheral)  BP: 136/75     HT: 66 in  WT: 165.1 lb  BMI: 26.64           General:  Alert and oriented, No acute distress.            Eye:  Normal conjunctiva.            HENT:  Normocephalic, Oral mucosa is moist, No pharyngeal erythema               Ear: Right ear, Canal has excess cerumen               Ear: Left ear, Canal has excess cerumen          Neck:  Supple, Non-tender          Respiratory:  Respirations are non-labored.            Cardiovascular:  Normal rate               Psychiatric:  Cooperative, Appropriate mood & affect.    Assessment/Plan       1. Encounter for immunization (Z23)         Ordered:          SARS-CoV-2 (COVID-19) mRNA-1273 vaccine, 0.25 mL, IM, once, (Completed)          0013A adm sarscov2 100mcg/0.5ml 3rd (Charge), Quantity: 1, Encounter for immunization          88232 sarscov2 vaccine 100mcg/0.5ml im use (Charge), Quantity: 1, Encounter for immunization                2. Excessive cerumen in both ear canals (H61.23)          Cerumen was removed with lavage revealing normal TM            Patient Information     Name:JUAN SANDERS      Address:      [address] [place] [id number]     Sex:Male     YOB: 1955     [phone]     Emergency Contact:YUDI SANDERS     MRN:[id number]     FIN:[id number]     Location:[facility]     Date of Service:12/15/2021      Primary Care Physician:       Juan Jasmine MD, [phone]      Attending Physician:       Juan Jasmine MD, [phone]  Problem List/Past Medical History    Ongoing     CA of  prostate     Cervical spinal stenosis     Chronic low back pain     Dupuytren's contracture     Elevated PSA     Family history of ischemic heart disease     History of prostate cancer     Hyperlipidemia    Historical     No qualifying data  Procedure/Surgical History     Colonoscopy (10/2019)      Comments: Per previous record, pt to f/u 10/2022---goes to [facility].     Repair of inguinal hernia (12/04/2017)      Comments: And umbilical hernia repair..     Radical retropubic prostatectomy (09/29/2015)      Comments: DaVinci procedure. Bilateral pelvic lymph node dissection and right ureteral stent placement..     Rectal biopsy (2000)      Comments: Negative.     Appendectomy  Medications    atorvastatin 40 mg oral tablet, 40 mg= 1 tab(s), Oral, daily, 3 refills    Co Q-10, 100 mg, Oral, daily    Daily Multi oral tablet, Oral, daily    sildenafil 20 mg oral tablet, 20 mg= 1 tab(s), Oral, tid    Vitamin C, daily    Vitamin D3, daily    Zinc, 140 mg, Oral, daily  Allergies    No Known Medication Allergies  Social History    Smoking Status     Never smoker     Alcohol      Current, Beer (12 oz), 1-2 times per week     Electronic Cigarette/Vaping      Electronic Cigarette Use: Never.     Employment/School      Retired     Exercise      Exercise frequency: 3-4 times/week. Exercise type: Weight lifting, Cardio.     Home/Environment      Marital status: . Spouse/Partner name: Denisse. Lives with Spouse.     Nutrition/Health      Type of diet: Regular.     Sexual      Sexual orientation: Straight or heterosexual.     Substance Abuse      Never     Tobacco      Never (less than 100 in lifetime)  Family History    Alcohol abuse: Mother.    Brain tumor: Granddaughter.    CA - Cancer of colon: Father.    CA - Cancer of pancreas: Sister.    Coronary artery disease: Father.    Dementia: Mother.    Heart disease: Mother.    Mental illness: Mother.  Lab Results       Lab Results (Last 4 results within 90 days)        Glucose  Level: 104 mg/dL High [65 mg/dL - 99 mg/dL] (09/30/21 12:14:00)       Cholesterol: 195 mg/dL (09/30/21 12:14:00)       Non-HDL Cholesterol: 121 (09/30/21 12:14:00)       HDL: 74 mg/dL (09/30/21 12:14:00)       Cholesterol/HDL Ratio: 2.6 (09/30/21 12:14:00)       LDL: 103 High (09/30/21 12:14:00)       Triglyceride: 88 mg/dL (09/30/21 12:14:00)  Immunizations       Scheduled Immunizations       Dose Date(s)       influenza virus vaccine, inactivated       10/17/2018, 10/14/2019, 10/06/2020       SARS-CoV-2 (COVID-19) Moderna-1273       04/07/2021, 05/12/2021       Td       05/22/1999       tetanus/diphth/pertuss (Tdap) adult/adol       05/20/2008       ZOS, shingles       02/26/2013       Other Immunizations               influenza virus vaccine, inactivated       09/30/2021       pneumococcal (PPSV23)       10/13/2020       tetanus/diphth/pertuss (Tdap) adult/adol       08/28/2018       pneumococcal (PCV13)       09/10/2019       SARS-CoV-2 (COVID-19) Moderna-1273       12/15/2021

## 2022-02-16 NOTE — PROGRESS NOTES
Patient came into the clinic today for curbside covid testing per [facility]. O2= 96%. Specimen sent to [facility]. Forms faxed to [facility]. Priority= None    AES

## 2022-02-16 NOTE — LETTER
(Inserted Image. Unable to display)       April 25, 2019      LIBIA SANDERS  [address]   [place], [place] [id number]          Dear LIBIA,      Thank you for selecting [facility] for your healthcare needs.     Our records indicate you are due for the following services:     Non-Fasting Labs    If you had your labs done at another facility or with Direct Access Lab Testing at [facility], please bring in a copy of the results to your next visit, mail a copy, or drop off a copy of your results to your Healthcare Provider.    To schedule an appointment or if you have further questions, please contact your primary clinic:   [facility]       [phone]   [facility]       [phone]              [facility]     [phone]    Powered by [employer] and [employer]    Sincerely,    Siddharth English MD

## 2022-02-16 NOTE — NURSING NOTE
CAGE Assessment Entered On:  9/30/2021 12:52 PM CDT    Performed On:  9/30/2021 12:52 PM CDT by Maxine Huerta MA               Assessment   Have you ever felt you should cut down on your drinking :   Yes   Have people annoyed you by criticizing your drinking :   No   Have you ever felt bad or guilty about your drinking :   No   Have you ever taken a drink first thing in the morning to steady your nerves or get rid of a hangover (Eye-opener) :   No   CAGE Score :   1    Mxaine Huerta MA - 9/30/2021 12:52 PM CDT

## 2022-11-09 ASSESSMENT — ENCOUNTER SYMPTOMS
FEVER: 0
ARTHRALGIAS: 0
SORE THROAT: 0
CONSTIPATION: 0
COUGH: 0
FREQUENCY: 0
HEARTBURN: 0
CHILLS: 0
DIARRHEA: 0
HEMATOCHEZIA: 0
DYSURIA: 0
PARESTHESIAS: 0
DIZZINESS: 0
SHORTNESS OF BREATH: 0
PALPITATIONS: 0
WEAKNESS: 0
HEMATURIA: 0
NERVOUS/ANXIOUS: 1
JOINT SWELLING: 0
NAUSEA: 0
ABDOMINAL PAIN: 0
HEADACHES: 0
EYE PAIN: 0
MYALGIAS: 0

## 2022-11-09 ASSESSMENT — ACTIVITIES OF DAILY LIVING (ADL): CURRENT_FUNCTION: NO ASSISTANCE NEEDED

## 2022-11-14 PROBLEM — E78.5 HYPERLIPIDEMIA: Status: ACTIVE | Noted: 2022-11-14

## 2022-11-14 PROBLEM — M48.02 SPINAL STENOSIS OF CERVICAL REGION: Status: ACTIVE | Noted: 2022-11-14

## 2022-11-14 PROBLEM — Z85.46 HISTORY OF MALIGNANT NEOPLASM OF PROSTATE: Status: ACTIVE | Noted: 2022-11-14

## 2022-11-14 PROBLEM — M72.0 DUPUYTREN'S CONTRACTURE: Status: ACTIVE | Noted: 2022-11-14

## 2022-11-14 PROBLEM — G89.29 CHRONIC LOW BACK PAIN: Status: ACTIVE | Noted: 2022-11-14

## 2022-11-14 PROBLEM — M54.50 CHRONIC LOW BACK PAIN: Status: ACTIVE | Noted: 2022-11-14

## 2022-11-15 ENCOUNTER — OFFICE VISIT (OUTPATIENT)
Dept: FAMILY MEDICINE | Facility: CLINIC | Age: 67
End: 2022-11-15
Payer: COMMERCIAL

## 2022-11-15 VITALS
DIASTOLIC BLOOD PRESSURE: 86 MMHG | WEIGHT: 166.8 LBS | OXYGEN SATURATION: 98 % | HEART RATE: 60 BPM | HEIGHT: 66 IN | TEMPERATURE: 97.5 F | BODY MASS INDEX: 26.81 KG/M2 | SYSTOLIC BLOOD PRESSURE: 137 MMHG

## 2022-11-15 DIAGNOSIS — Z13.1 SCREENING FOR DIABETES MELLITUS: ICD-10-CM

## 2022-11-15 DIAGNOSIS — Z85.46 HISTORY OF MALIGNANT NEOPLASM OF PROSTATE: ICD-10-CM

## 2022-11-15 DIAGNOSIS — Z86.0100 HISTORY OF COLONIC POLYPS: ICD-10-CM

## 2022-11-15 DIAGNOSIS — E78.5 HYPERLIPIDEMIA, UNSPECIFIED HYPERLIPIDEMIA TYPE: ICD-10-CM

## 2022-11-15 DIAGNOSIS — Z00.00 ENCOUNTER FOR MEDICARE ANNUAL WELLNESS EXAM: Primary | ICD-10-CM

## 2022-11-15 DIAGNOSIS — Z11.59 NEED FOR HEPATITIS C SCREENING TEST: ICD-10-CM

## 2022-11-15 DIAGNOSIS — Z23 NEED FOR VACCINATION: ICD-10-CM

## 2022-11-15 LAB
CHOLEST SERPL-MCNC: 214 MG/DL
FASTING STATUS PATIENT QL REPORTED: NO
GLUCOSE SERPL-MCNC: 111 MG/DL (ref 70–99)
HDLC SERPL-MCNC: 79 MG/DL
LDLC SERPL CALC-MCNC: 114 MG/DL
NONHDLC SERPL-MCNC: 135 MG/DL
PSA SERPL-MCNC: <0.01 NG/ML (ref 0–4.5)
TRIGL SERPL-MCNC: 105 MG/DL

## 2022-11-15 PROCEDURE — 86803 HEPATITIS C AB TEST: CPT | Performed by: FAMILY MEDICINE

## 2022-11-15 PROCEDURE — 36415 COLL VENOUS BLD VENIPUNCTURE: CPT | Performed by: FAMILY MEDICINE

## 2022-11-15 PROCEDURE — 99213 OFFICE O/P EST LOW 20 MIN: CPT | Mod: 25 | Performed by: FAMILY MEDICINE

## 2022-11-15 PROCEDURE — 80061 LIPID PANEL: CPT | Performed by: FAMILY MEDICINE

## 2022-11-15 PROCEDURE — 82947 ASSAY GLUCOSE BLOOD QUANT: CPT | Mod: QW | Performed by: FAMILY MEDICINE

## 2022-11-15 PROCEDURE — 84153 ASSAY OF PSA TOTAL: CPT | Performed by: FAMILY MEDICINE

## 2022-11-15 PROCEDURE — G0439 PPPS, SUBSEQ VISIT: HCPCS | Performed by: FAMILY MEDICINE

## 2022-11-15 RX ORDER — ATORVASTATIN CALCIUM 40 MG/1
40 TABLET, FILM COATED ORAL EVERY MORNING
Qty: 90 TABLET | Refills: 3 | Status: SHIPPED | OUTPATIENT
Start: 2022-11-15 | End: 2023-11-20

## 2022-11-15 RX ORDER — SILDENAFIL CITRATE 20 MG/1
20 TABLET ORAL DAILY PRN
Qty: 60 TABLET | Refills: 2 | Status: SHIPPED | OUTPATIENT
Start: 2022-11-15 | End: 2023-11-20

## 2022-11-15 RX ORDER — TRIAMCINOLONE ACETONIDE 1 MG/G
1 CREAM TOPICAL 3 TIMES DAILY PRN
COMMUNITY
Start: 2022-10-31

## 2022-11-15 ASSESSMENT — ACTIVITIES OF DAILY LIVING (ADL): CURRENT_FUNCTION: NO ASSISTANCE NEEDED

## 2022-11-15 ASSESSMENT — ENCOUNTER SYMPTOMS
ABDOMINAL PAIN: 0
DIZZINESS: 0
SORE THROAT: 0
HEARTBURN: 0
CONSTIPATION: 0
JOINT SWELLING: 0
NERVOUS/ANXIOUS: 1
EYE PAIN: 0
HEMATURIA: 0
PARESTHESIAS: 0
HEADACHES: 0
MYALGIAS: 0
DIARRHEA: 0
CHILLS: 0
FEVER: 0
PALPITATIONS: 0
NAUSEA: 0
DYSURIA: 0
FREQUENCY: 0
SHORTNESS OF BREATH: 0
ARTHRALGIAS: 0
HEMATOCHEZIA: 0
COUGH: 0
WEAKNESS: 0

## 2022-11-15 NOTE — PATIENT INSTRUCTIONS
Patient Education   Personalized Prevention Plan  You are due for the preventive services outlined below.  Your care team is available to assist you in scheduling these services.  If you have already completed any of these items, please share that information with your care team to update in your medical record.  Health Maintenance Due   Topic Date Due     ANNUAL REVIEW OF HM ORDERS  Never done     Hepatitis C Screening  Never done     Zoster (Shingles) Vaccine (2 of 3) 04/23/2013     AORTIC ANEURYSM SCREENING (SYSTEM ASSIGNED)  Never done     COVID-19 Vaccine (4 - Booster for Moderna series) 02/09/2022     Colorectal Cancer Screening  10/09/2022       Understanding USDA MyPlate  The USDA has guidelines to help you make healthy food choices. These are called MyPlate. MyPlate shows the food groups that make up healthy meals using the image of a place setting. Before you eat, think about the healthiest choices for what to put on your plate or in your cup or bowl. To learn more about building a healthy plate, visit www.choosemyplate.gov.    The food groups    Fruits. Any fruit or 100% fruit juice counts as part of the Fruit Group. Fruits may be fresh, canned, frozen, or dried, and may be whole, cut-up, or pureed. Make 1/2 of your plate fruits and vegetables.    Vegetables. Any vegetable or 100% vegetable juice counts as a member of the Vegetable Group. Vegetables may be fresh, frozen, canned, or dried. They can be served raw or cooked and may be whole, cut-up, or mashed. Make 1/2 of your plate fruits and vegetables.    Grains. All foods made from grains are part of the Grains Group. These include wheat, rice, oats, cornmeal, and barley. Grains are often used to make foods such as bread, pasta, oatmeal, cereal, tortillas, and grits. Grains should be no more than 1/4 of your plate. At least half of your grains should be whole grains.    Protein. This group includes meat, poultry, seafood, beans and peas, eggs,  processed soy products (such as tofu), nuts (including nut butters), and seeds. Make protein choices no more than 1/4 of your plate. Meat and poultry choices should be lean or low fat.    Dairy. The Dairy Group includes all fluid milk products and foods made from milk that contain calcium, such as yogurt and cheese. (Foods that have little calcium, such as cream, butter, and cream cheese, are not part of this group.) Most dairy choices should be low-fat or fat-free.    Oils. Oils aren't a food group, but they do contain essential nutrients. However it's important to watch your intake of oils. These are fats that are liquid at room temperature. They include canola, corn, olive, soybean, vegetable, and sunflower oil. Foods that are mainly oil include mayonnaise, certain salad dressings, and soft margarines. You likely already get your daily oil allowance from the foods you eat.  Things to limit  Eating healthy also means limiting these things in your diet:       Salt (sodium). Many processed foods have a lot of sodium. To keep sodium intake down, eat fresh vegetables, meats, poultry, and seafood when possible. Purchase low-sodium, reduced-sodium, or no-salt-added food products at the store. And don't add salt to your meals at home. Instead, season them with herbs and spices such as dill, oregano, cumin, and paprika. Or try adding flavor with lemon or lime zest and juice.    Saturated fat. Saturated fats are most often found in animal products such as beef, pork, and chicken. They are often solid at room temperature, such as butter. To reduce your saturated fat intake, choose leaner cuts of meat and poultry. And try healthier cooking methods such as grilling, broiling, roasting, or baking. For a simple lower-fat swap, use plain nonfat yogurt instead of mayonnaise when making potato salad or macaroni salad.    Added sugars. These are sugars added to foods. They are in foods such as ice cream, candy, soda, fruit drinks,  sports drinks, energy drinks, cookies, pastries, jams, and syrups. Cut down on added sugars by sharing sweet treats with a family member or friend. You can also choose fruit for dessert, and drink water or other unsweetened beverages.     [employer] last reviewed this educational content on 6/1/2020 2000-2021 [employer]. All rights reserved. This information is not intended as a substitute for professional medical care. Always follow your healthcare professional's instructions.          Signs of Hearing Loss      Hearing much better with one ear can be a sign of hearing loss.   Hearing loss is a problem shared by many people. In fact, it is one of the most common health problems, particularly as people age. Most people age 65 and older have some hearing loss. By age 80, almost everyone does. Hearing loss often occurs slowly over the years. So you may not realize your hearing has gotten worse.  Have your hearing checked  Call your healthcare provider if you:    Have to strain to hear normal conversation    Have to watch other people s faces very carefully to follow what they re saying    Need to ask people to repeat what they ve said    Often misunderstand what people are saying    Turn the volume of the television or radio up so high that others complain    Feel that people are mumbling when they re talking to you    Find that the effort to hear leaves you feeling tired and irritated    Notice, when using the phone, that you hear better with one ear than the other  [employer]mill last reviewed this educational content on 1/1/2020 2000-2021 [employer]. All rights reserved. This information is not intended as a substitute for professional medical care. Always follow your healthcare professional's instructions.

## 2022-11-15 NOTE — PROGRESS NOTES
"SUBJECTIVE:   Freeman is a 67 year old who presents for Preventive Visit.      Patient has been advised of split billing requirements and indicates understanding: Yes  Are you in the first 12 months of your Medicare coverage?  No    Healthy Habits:     In general, how would you rate your overall health?  Good    Frequency of exercise:  2-3 days/week    Duration of exercise:  45-60 minutes    Do you usually eat at least 4 servings of fruit and vegetables a day, include whole grains    & fiber and avoid regularly eating high fat or \"junk\" foods?  No    Taking medications regularly:  Yes    Medication side effects:  None    Ability to successfully perform activities of daily living:  No assistance needed    Home Safety:  No safety concerns identified    Hearing Impairment:  Difficulty following a conversation in a noisy restaurant or crowded room and difficulty understanding soft or whispered speech    In the past 6 months, have you been bothered by leaking of urine?  No    In general, how would you rate your overall mental or emotional health?  Good      PHQ-2 Total Score: 1    Additional concerns today:  No    Patient is here for wellness visit.  He has well-controlled hyperlipidemia and tolerates atorvastatin 40 mg daily.  He uses sildenafil for erectile dysfunction.  Overall has been feeling well lately.  He reports intermittent anxiety with symptoms of mild dread and chest tightness.  He had a prostatectomy about 7 years ago.    Do you feel safe in your environment? Yes    Have you ever done Advance Care Planning? (For example, a Health Directive, POLST, or a discussion with a medical provider or your loved ones about your wishes): Yes, patient states has an Advance Care Planning document and will bring a copy to the clinic.      Right Ear:      1000 Hz RESPONSE- on Level:   20 db  (Conditioning sound)   1000 Hz: RESPONSE- on Level:   20 db    2000 Hz: RESPONSE- on Level:   20 db    4000 Hz: RESPONSE- on Level:   20 " db     Left Ear:      4000 Hz: RESPONSE- on Level: tone not heard   2000 Hz: RESPONSE- on Level: tone not heard   1000 Hz: RESPONSE- on Level:   20 db     500 Hz: RESPONSE- on Level: 25 db    Right Ear:    500 Hz: RESPONSE- on Level:   20 db     Hearing Acuity: REFER    Hearing Assessment: Decreased on the left  Fall risk  Fallen 2 or more times in the past year?: No  Any fall with injury in the past year?: No    Cognitive Screening   1) Repeat 3 items Banana, Sunrise, Chair  2) Clock draw: NORMAL  3) 3 item recall: Recalls 3 objects  Results: 3 items recalled: COGNITIVE IMPAIRMENT LESS LIKELY    Mini-CogTM Copyright S Lili. Licensed by the author for use in [facility]; reprinted with permission (soob@[email or web address]). All rights reserved.          Reviewed and updated as needed this visit by clinical staff   Tobacco  Allergies  Meds              Reviewed and updated as needed this visit by Provider                 Social History     Tobacco Use     Smoking status: Never     Smokeless tobacco: Never   Substance Use Topics     Alcohol use: Yes     Comment: Alcoholic Drinks/day: occasional     If you drink alcohol do you typically have >3 drinks per day or >7 drinks per week? No    Alcohol Use 11/9/2022   Prescreen: >3 drinks/day or >7 drinks/week? No   No flowsheet data found.    Current providers sharing in care for this patient include:   Patient Care Team:  Juan Jasmine MD as PCP - General (Family Medicine)  Siddharth Lee MD (Inactive) (Family Practice)  Juan Jasmine MD as Assigned PCP    The following health maintenance items are reviewed in Epic and correct as of today:  Health Maintenance   Topic Date Due     ANNUAL REVIEW OF HM ORDERS  Never done     ADVANCE CARE PLANNING  Never done     HEPATITIS C SCREENING  Never done     ZOSTER IMMUNIZATION (2 of 3) 04/23/2013     MEDICARE ANNUAL WELLNESS VISIT  Never done     AORTIC ANEURYSM SCREENING (SYSTEM ASSIGNED)  Never done     COVID-19  "Vaccine (4 - Booster for Moderna series) 02/09/2022     COLORECTAL CANCER SCREENING  10/09/2022     FALL RISK ASSESSMENT  11/15/2023     LIPID  09/30/2026     DTAP/TDAP/TD IMMUNIZATION (3 - Td or Tdap) 08/28/2028     PHQ-2 (once per calendar year)  Completed     INFLUENZA VACCINE  Completed     Pneumococcal Vaccine: 65+ Years  Completed     IPV IMMUNIZATION  Aged Out     MENINGITIS IMMUNIZATION  Aged Out     Lab work is in process  Labs reviewed in EPIC          Review of Systems   Constitutional: Negative for chills and fever.   HENT: Positive for hearing loss. Negative for congestion, ear pain and sore throat.    Eyes: Negative for pain and visual disturbance.   Respiratory: Negative for cough and shortness of breath.    Cardiovascular: Negative for chest pain, palpitations and peripheral edema.   Gastrointestinal: Negative for abdominal pain, constipation, diarrhea, heartburn, hematochezia and nausea.   Genitourinary: Positive for impotence. Negative for dysuria, frequency, genital sores, hematuria, penile discharge and urgency.   Musculoskeletal: Negative for arthralgias, joint swelling and myalgias.   Skin: Positive for rash.   Neurological: Negative for dizziness, weakness, headaches and paresthesias.   Psychiatric/Behavioral: Negative for mood changes. The patient is nervous/anxious.          OBJECTIVE:   /86 (BP Location: Right arm, Patient Position: Sitting, Cuff Size: Adult Large)   Pulse 60   Temp 97.5  F (36.4  C) (Tympanic)   Ht 1.676 m (5' 6\")   Wt 75.7 kg (166 lb 12.8 oz)   SpO2 98%   BMI 26.92 kg/m   Estimated body mass index is 26.92 kg/m  as calculated from the following:    Height as of this encounter: 1.676 m (5' 6\").    Weight as of this encounter: 75.7 kg (166 lb 12.8 oz).  Physical Exam  GENERAL: healthy, alert and no distress  EYES: Eyes grossly normal to inspection, PERRL and conjunctivae and sclerae normal  HENT: ear canals and TM's normal, nose and mouth without ulcers or " lesions  NECK: no adenopathy, no asymmetry, masses, or scars and thyroid normal to palpation  RESP: lungs clear to auscultation - no rales, rhonchi or wheezes  CV: regular rate and rhythm, normal S1 S2, no S3 or S4, no murmur, click or rub, no peripheral edema and peripheral pulses strong  ABDOMEN: soft, nontender, no hepatosplenomegaly, no masses and bowel sounds normal  MS: no gross musculoskeletal defects noted, no edema  SKIN: no suspicious lesions or rashes  NEURO: Normal strength and tone, mentation intact and speech normal  PSYCH: mentation appears normal, affect normal/bright        ASSESSMENT / PLAN:   Juan was seen today for wellness visit.    Diagnoses and all orders for this visit:    Encounter for Medicare annual wellness exam    Hyperlipidemia, unspecified hyperlipidemia type  -     Lipid panel reflex to direct LDL Fasting  -     atorvastatin (LIPITOR) 40 MG tablet; Take 1 tablet (40 mg) by mouth every morning    History of colonic polyps   He has colonoscopy scheduled early next year through [facility]  History of malignant neoplasm of prostate  -     sildenafil (REVATIO) 20 MG tablet; Take 1 tablet (20 mg) by mouth daily as needed  -     PSA tumor marker    Need for hepatitis C screening test  -     Hepatitis C antibody    Need for vaccination   He will complete his COVID series when he returns from vacation  Screening for diabetes mellitus  -     Glucose    Other orders  -     REVIEW OF HEALTH MAINTENANCE PROTOCOL ORDERS    We have discussed mindfulness training to help with his mild anxiety.  He will look into some online resources.  Patient has been advised of split billing requirements and indicates understanding: Yes      COUNSELING:  Reviewed preventive health counseling, as reflected in patient instructions       Regular exercise       Healthy diet/nutrition       Vision screening       Hearing screening       Dental care       Bladder control       Fall risk prevention       " Colon cancer screening    Estimated body mass index is 26.92 kg/m  as calculated from the following:    Height as of this encounter: 1.676 m (5' 6\").    Weight as of this encounter: 75.7 kg (166 lb 12.8 oz).        He reports that he has never smoked. He has never used smokeless tobacco.      Appropriate preventive services were discussed with this patient, including applicable screening as appropriate for cardiovascular disease, diabetes, osteopenia/osteoporosis, and glaucoma.  As appropriate for age/gender, discussed screening for colorectal cancer, prostate cancer, breast cancer, and cervical cancer. Checklist reviewing preventive services available has been given to the patient.    Reviewed patients plan of care and provided an AVS. The Basic Care Plan (routine screening as documented in Health Maintenance) for Juan meets the Care Plan requirement. This Care Plan has been established and reviewed with the Patient.    Counseling Resources:  ATP IV Guidelines  Pooled Cohorts Equation Calculator  Breast Cancer Risk Calculator  Breast Cancer: Medication to Reduce Risk  FRAX Risk Assessment  ICSI Preventive Guidelines  Dietary Guidelines for Americans, 2010  USDA's MyPlate  ASA Prophylaxis  Lung CA Screening    Juan Jasmine MD  [facility]    Identified Health Risks:    The patient was counseled and encouraged to consider modifying their diet and eating habits. He was provided with information on recommended healthy diet options.  The patient was provided with written information regarding signs of hearing loss.  "

## 2022-11-15 NOTE — LETTER
November 16, 2022      Freeman Valadez  [address]  [place] [address]        Dear ,    We are writing to inform you of your test results.    Your test results fall within the expected range(s) or remain unchanged from previous results.  Please continue with current treatment plan.    Resulted Orders   Lipid panel reflex to direct LDL Fasting   Result Value Ref Range    Cholesterol 214 (H) <200 mg/dL    Triglycerides 105 <150 mg/dL    Direct Measure HDL 79 >=40 mg/dL    LDL Cholesterol Calculated 114 (H) <=100 mg/dL    Non HDL Cholesterol 135 (H) <130 mg/dL    Narrative    Cholesterol  Desirable:  <200 mg/dL    Triglycerides  Normal:  Less than 150 mg/dL  Borderline High:  150-199 mg/dL  High:  200-499 mg/dL  Very High:  Greater than or equal to 500 mg/dL    Direct Measure HDL  Female:  Greater than or equal to 50 mg/dL   Male:  Greater than or equal to 40 mg/dL    LDL Cholesterol  Desirable:  <100mg/dL  Above Desirable:  100-129 mg/dL   Borderline High:  130-159 mg/dL   High:  160-189 mg/dL   Very High:  >= 190 mg/dL    Non HDL Cholesterol  Desirable:  130 mg/dL  Above Desirable:  130-159 mg/dL  Borderline High:  160-189 mg/dL  High:  190-219 mg/dL  Very High:  Greater than or equal to 220 mg/dL   Glucose   Result Value Ref Range    Glucose 111 (H) 70 - 99 mg/dL    Patient Fasting > 8hrs? No    PSA tumor marker   Result Value Ref Range    PSA Tumor Marker <0.01 0.00 - 4.50 ng/mL    Narrative    This result is obtained using the Roche Elecsys total PSA method on the karina e801 immunoassay analyzer. Results obtained with different assay methods or kits cannot be used interchangeably.   Hepatitis C antibody   Result Value Ref Range    Hepatitis C Antibody Nonreactive Nonreactive    Narrative    Assay performance characteristics have not been established for newborns, infants, and children.       If you have any questions or concerns, please call the clinic at the number listed above.        Sincerely,      Juan Jasmine MD

## 2022-11-16 ENCOUNTER — TELEPHONE (OUTPATIENT)
Dept: FAMILY MEDICINE | Facility: CLINIC | Age: 67
End: 2022-11-16

## 2022-11-16 LAB — HCV AB SERPL QL IA: NONREACTIVE

## 2022-11-16 NOTE — TELEPHONE ENCOUNTER
PRIOR AUTHORIZATION DENIED    Medication: sildenafil (REVATIO) 20 MG tablet - EPA DENIED    Denial Date: 11/15/2022    Denial Rational:       Appeal Information:

## 2023-02-16 ENCOUNTER — TRANSFERRED RECORDS (OUTPATIENT)
Dept: HEALTH INFORMATION MANAGEMENT | Facility: CLINIC | Age: 68
End: 2023-02-16
Payer: COMMERCIAL

## 2023-04-13 ENCOUNTER — TRANSFERRED RECORDS (OUTPATIENT)
Dept: HEALTH INFORMATION MANAGEMENT | Facility: CLINIC | Age: 68
End: 2023-04-13
Payer: MEDICARE

## 2023-11-16 PROBLEM — K42.9 UMBILICAL HERNIA: Status: ACTIVE | Noted: 2017-10-16

## 2023-11-20 ENCOUNTER — OFFICE VISIT (OUTPATIENT)
Dept: FAMILY MEDICINE | Facility: CLINIC | Age: 68
End: 2023-11-20
Payer: COMMERCIAL

## 2023-11-20 ENCOUNTER — TELEPHONE (OUTPATIENT)
Dept: FAMILY MEDICINE | Facility: CLINIC | Age: 68
End: 2023-11-20

## 2023-11-20 VITALS
RESPIRATION RATE: 16 BRPM | OXYGEN SATURATION: 99 % | TEMPERATURE: 96.7 F | DIASTOLIC BLOOD PRESSURE: 83 MMHG | WEIGHT: 165.7 LBS | BODY MASS INDEX: 26.63 KG/M2 | HEIGHT: 66 IN | SYSTOLIC BLOOD PRESSURE: 146 MMHG | HEART RATE: 58 BPM

## 2023-11-20 DIAGNOSIS — M48.02 SPINAL STENOSIS OF CERVICAL REGION: ICD-10-CM

## 2023-11-20 DIAGNOSIS — C61 MALIGNANT NEOPLASM OF PROSTATE (H): ICD-10-CM

## 2023-11-20 DIAGNOSIS — H61.23 BILATERAL IMPACTED CERUMEN: ICD-10-CM

## 2023-11-20 DIAGNOSIS — E78.5 HYPERLIPIDEMIA, UNSPECIFIED HYPERLIPIDEMIA TYPE: ICD-10-CM

## 2023-11-20 DIAGNOSIS — Z00.00 ENCOUNTER FOR MEDICARE ANNUAL WELLNESS EXAM: Primary | ICD-10-CM

## 2023-11-20 DIAGNOSIS — Z85.46 HISTORY OF MALIGNANT NEOPLASM OF PROSTATE: ICD-10-CM

## 2023-11-20 LAB
CHOLEST SERPL-MCNC: 197 MG/DL
HDLC SERPL-MCNC: 80 MG/DL
LDLC SERPL CALC-MCNC: 99 MG/DL
NONHDLC SERPL-MCNC: 117 MG/DL
PSA SERPL DL<=0.01 NG/ML-MCNC: <0.01 NG/ML (ref 0–4.5)
TRIGL SERPL-MCNC: 92 MG/DL

## 2023-11-20 PROCEDURE — 84153 ASSAY OF PSA TOTAL: CPT | Performed by: FAMILY MEDICINE

## 2023-11-20 PROCEDURE — 80061 LIPID PANEL: CPT | Performed by: FAMILY MEDICINE

## 2023-11-20 PROCEDURE — 69210 REMOVE IMPACTED EAR WAX UNI: CPT | Performed by: FAMILY MEDICINE

## 2023-11-20 PROCEDURE — 90480 ADMN SARSCOV2 VAC 1/ONLY CMP: CPT | Performed by: FAMILY MEDICINE

## 2023-11-20 PROCEDURE — 99213 OFFICE O/P EST LOW 20 MIN: CPT | Mod: 25 | Performed by: FAMILY MEDICINE

## 2023-11-20 PROCEDURE — G0439 PPPS, SUBSEQ VISIT: HCPCS | Performed by: FAMILY MEDICINE

## 2023-11-20 PROCEDURE — G0008 ADMIN INFLUENZA VIRUS VAC: HCPCS | Performed by: FAMILY MEDICINE

## 2023-11-20 PROCEDURE — 36415 COLL VENOUS BLD VENIPUNCTURE: CPT | Performed by: FAMILY MEDICINE

## 2023-11-20 PROCEDURE — 91320 SARSCV2 VAC 30MCG TRS-SUC IM: CPT | Performed by: FAMILY MEDICINE

## 2023-11-20 PROCEDURE — 90662 IIV NO PRSV INCREASED AG IM: CPT | Performed by: FAMILY MEDICINE

## 2023-11-20 RX ORDER — ATORVASTATIN CALCIUM 40 MG/1
40 TABLET, FILM COATED ORAL EVERY MORNING
Qty: 90 TABLET | Refills: 3 | Status: SHIPPED | OUTPATIENT
Start: 2023-11-20

## 2023-11-20 RX ORDER — SILDENAFIL CITRATE 20 MG/1
20 TABLET ORAL DAILY PRN
Qty: 60 TABLET | Refills: 2 | Status: SHIPPED | OUTPATIENT
Start: 2023-11-20 | End: 2023-11-27

## 2023-11-20 ASSESSMENT — ACTIVITIES OF DAILY LIVING (ADL): CURRENT_FUNCTION: NO ASSISTANCE NEEDED

## 2023-11-20 NOTE — TELEPHONE ENCOUNTER
Central Prior Authorization Team   Phone: [phone]    PRIOR AUTHORIZATION DENIED    Medication: SILDENAFIL CITRATE 20 MG PO TABS  Insurance Company: [employer] - Phone [phone] Fax [phone]  Denial Date: 11/20/2023  Denial Rational: COVERAGE REQUIRES AN FDA APPROVED DX      Appeal Information: IF PROVIDER WOULD LIKE TO APPEAL THIS DECISION PLEASE PROVIDE THE PA TEAM WITH A LETTER OF MEDICAL NECESSITY        Patient Notified: No

## 2023-11-20 NOTE — PROGRESS NOTES
"SUBJECTIVE:   Freeman is a 68 year old, presenting for the following:  Wellness Visit (AWV)        11/20/2023    10:25 AM   Additional Questions   Roomed by Maxine RICHARD CMA   Accompanied by Self       Are you in the first 12 months of your Medicare coverage?  No    Healthy Habits:     In general, how would you rate your overall health?  Good    Frequency of exercise:  2-3 days/week    Duration of exercise:  45-60 minutes    Do you usually eat at least 4 servings of fruit and vegetables a day, include whole grains    & fiber and avoid regularly eating high fat or \"junk\" foods?  Yes    Taking medications regularly:  Yes    Medication side effects:  None    Ability to successfully perform activities of daily living:  No assistance needed    Home Safety:  Throw rugs in the hallway and lack of grab bars in the bathroom    Hearing Impairment:  Difficulty following a conversation in a noisy restaurant or crowded room, feel that people are mumbling or not speaking clearly and find that men's voices are easier to understand than woman's    In the past 6 months, have you been bothered by leaking of urine?  No    In general, how would you rate your overall mental or emotional health?  Good    Additional concerns today:  No    Patient is here for Medicare wellness check.  He has history of hyperlipidemia, prostate cancer, and cervical spinal stenosis.  He reports having left upper extremity paresthesias.  He falls the C7 and C8 dermatome.  This can be intermittent in nature.  In the past he has had an MRI showing cervical spinal stenosis.  He denies any significant weakness.  Sildenafil has been successfully treating his erectile dysfunction.  Today's PHQ-2 Score:       11/20/2023    10:20 AM   PHQ-2 ( 1999 [employer])   Q1: Little interest or pleasure in doing things 0   Q2: Feeling down, depressed or hopeless 0   PHQ-2 Score 0   Q1: Little interest or pleasure in doing things Not at all   Q2: Feeling down, depressed or hopeless Not at " all   PHQ-2 Score 0       Have you ever done Advance Care Planning? (For example, a Health Directive, POLST, or a discussion with a medical provider or your loved ones about your wishes): Yes, patient states has an Advance Care Planning document and will bring a copy to the clinic.      Right Ear:      1000 Hz RESPONSE- on Level:   20 db  (Conditioning sound)   1000 Hz: RESPONSE- on Level:   20 db    2000 Hz: RESPONSE- on Level:   20 db    4000 Hz: RESPONSE- on Level: tone not heard    Left Ear:      4000 Hz: RESPONSE- on Level: tone not heard   2000 Hz: RESPONSE- on Level: tone not heard   1000 Hz: RESPONSE- on Level:   20 db     500 Hz: RESPONSE- on Level:   20 db     Right Ear:    500 Hz: RESPONSE- on Level:   20 db     Hearing Acuity: REFER    Hearing Assessment: abnormal--refer to audiology   Fall risk  Fallen 2 or more times in the past year?: No  Any fall with injury in the past year?: No    Cognitive Screening   1) Repeat 3 items (Leader, Season, Table)    2) Clock draw: NORMAL  3) 3 item recall: Recalls 3 objects  Results: 3 items recalled: COGNITIVE IMPAIRMENT LESS LIKELY    Mini-CogTM Copyright S Lili. Licensed by the author for use in [facility]; reprinted with permission ([email or web address]). All rights reserved.          Reviewed and updated as needed this visit by clinical staff                  Reviewed and updated as needed this visit by Provider                 Social History     Tobacco Use    Smoking status: Never    Smokeless tobacco: Never   Substance Use Topics    Alcohol use: Yes     Comment: Alcoholic Drinks/day: occasional             11/20/2023    10:20 AM   Alcohol Use   Prescreen: >3 drinks/day or >7 drinks/week? No          No data to display              Do you have a current opioid prescription? No  Do you use any other controlled substances or medications that are not prescribed by a provider? None        Current providers sharing in care for this patient include:   Patient  "Care Team:  Juan Jasmine MD as PCP - General (Family Medicine)  Siddharth Lee MD (Inactive) (Family Practice)  Juan Jasmine MD as Assigned PCP    The following health maintenance items are reviewed in Epic and correct as of today:  Health Maintenance   Topic Date Due    ZOSTER IMMUNIZATION (2 of 3) 04/23/2013    RSV VACCINE (Pregnancy & 60+) (1 - 1-dose 60+ series) Never done    INFLUENZA VACCINE (1) 09/01/2023    COVID-19 Vaccine (5 - 2023-24 season) 09/01/2023    ANNUAL REVIEW OF HM ORDERS  11/15/2023    MEDICARE ANNUAL WELLNESS VISIT  11/15/2023    FALL RISK ASSESSMENT  11/20/2024    LIPID  11/15/2027    ADVANCE CARE PLANNING  11/15/2027    COLORECTAL CANCER SCREENING  02/16/2028    DTAP/TDAP/TD IMMUNIZATION (3 - Td or Tdap) 08/28/2028    HEPATITIS C SCREENING  Completed    PHQ-2 (once per calendar year)  Completed    Pneumococcal Vaccine: 65+ Years  Completed    IPV IMMUNIZATION  Aged Out    HPV IMMUNIZATION  Aged Out    MENINGITIS IMMUNIZATION  Aged Out    RSV MONOCLONAL ANTIBODY  Aged Out    AORTIC ANEURYSM SCREENING (SYSTEM ASSIGNED)  Discontinued     Labs reviewed in EPIC          Review of Systems   HENT:  Positive for hearing loss.    Genitourinary:  Positive for impotence. Negative for penile discharge.         OBJECTIVE:   There were no vitals taken for this visit. Estimated body mass index is 26.92 kg/m  as calculated from the following:    Height as of 11/15/22: 1.676 m (5' 6\").    Weight as of 11/15/22: 75.7 kg (166 lb 12.8 oz).  Physical Exam  GENERAL: healthy, alert and no distress  EYES: Eyes grossly normal to inspection, PERRL and conjunctivae and sclerae normal  HENT: normal cephalic/atraumatic, both ears: occluded with wax, nose and mouth without ulcers or lesions, oropharynx clear, and oral mucous membranes moist  NECK: no adenopathy, no asymmetry, masses, or scars and thyroid normal to palpation  RESP: lungs clear to auscultation - no rales, rhonchi or wheezes  CV: regular " rate and rhythm, normal S1 S2, no S3 or S4, no murmur, click or rub, no peripheral edema and peripheral pulses strong  ABDOMEN: soft, nontender, no hepatosplenomegaly, no masses and bowel sounds normal  MS: no gross musculoskeletal defects noted, no edema  SKIN: no suspicious lesions or rashes  NEURO: Slight decreased sensation in the ulnar nerve distribution of the left hand, very mild weakness of the triceps and wrist flexors  PSYCH: mentation appears normal, affect normal/bright        ASSESSMENT / PLAN:   (Z00.00) Encounter for Medicare annual wellness exam  (primary encounter diagnosis)  Comment: Doing well  Plan: Discussed health maintenance, appropriate diet, exercise    (E78.5) Hyperlipidemia, unspecified hyperlipidemia type  Comment: Stable  Plan: atorvastatin (LIPITOR) 40 MG tablet, Lipid         panel reflex to direct LDL Fasting        Check labs and continue current dose    (Z85.46) History of malignant neoplasm of prostate  Comment: No history of recurrence  Plan: sildenafil (REVATIO) 20 MG tablet, PSA tumor         marker        Check PSA    (M48.02) Spinal stenosis of cervical region  Comment: Recent symptoms of paresthesia certainly could be attributed to spinal stenosis.  We have discussed options including reimaging, spine surgery referral, and physical therapy.  He would like to try therapy now.  Plan: Physical Therapy Referral    (H61.23) Bilateral impacted cerumen  Comment: Currently mildly symptomatic  Plan: Cerumen was removed with combination of lavage and curette revealing normal TMs bilaterally    Patient has been advised of split billing requirements and indicates understanding: Yes      COUNSELING:  Reviewed preventive health counseling, as reflected in patient instructions       Regular exercise       Healthy diet/nutrition       Vision screening       Hearing screening       Dental care       Bladder control       Fall risk prevention       Colon cancer screening        He reports that  he has never smoked. He has never used smokeless tobacco.      Appropriate preventive services were discussed with this patient, including applicable screening as appropriate for fall prevention, nutrition, physical activity, Tobacco-use cessation, weight loss and cognition.  Checklist reviewing preventive services available has been given to the patient.    Reviewed patients plan of care and provided an AVS. The Basic Care Plan (routine screening as documented in Health Maintenance) for Juan meets the Care Plan requirement. This Care Plan has been established and reviewed with the Patient.    Prior to immunization administration, verified patients identity using patient s name and date of birth. Please see Immunization Activity for additional information.     Screening Questionnaire for Adult Immunization    Are you sick today?   No   Do you have allergies to medications, food, a vaccine component or latex?   No   Have you ever had a serious reaction after receiving a vaccination?   No   Do you have a long-term health problem with heart, lung, kidney, or metabolic disease (e.g., diabetes), asthma, a blood disorder, no spleen, complement component deficiency, a cochlear implant, or a spinal fluid leak?  Are you on long-term aspirin therapy?   No   Do you have cancer, leukemia, HIV/AIDS, or any other immune system problem?   Yes   Do you have a parent, brother, or sister with an immune system problem?   No   In the past 3 months, have you taken medications that affect  your immune system, such as prednisone, other steroids, or anticancer drugs; drugs for the treatment of rheumatoid arthritis, Crohn s disease, or psoriasis; or have you had radiation treatments?   No   Have you had a seizure, or a brain or other nervous system problem?   No   During the past year, have you received a transfusion of blood or blood    products, or been given immune (gamma) globulin or antiviral drug?   No   For women: Are you pregnant  or is there a chance you could become       pregnant during the next month?   No   Have you received any vaccinations in the past 4 weeks?   No     Immunization questionnaire was positive for at least one answer.  Notified Dr. Jasmine.      Patient instructed to remain in clinic for 15 minutes afterwards, and to report any adverse reactions.     Screening performed by Maxine Huerta MA on 11/20/2023 at 10:37 AM.         Juan Jasmine MD  [facility]    Identified Health Risks:  I have reviewed Opioid Use Disorder and Substance Use Disorder risk factors and made any needed referrals.

## 2023-11-20 NOTE — PATIENT INSTRUCTIONS
Patient Education   Personalized Prevention Plan  You are due for the preventive services outlined below.  Your care team is available to assist you in scheduling these services.  If you have already completed any of these items, please share that information with your care team to update in your medical record.  Health Maintenance Due   Topic Date Due     Zoster (Shingles) Vaccine (2 of 3) 04/23/2013     RSV VACCINE (Pregnancy & 60+) (1 - 1-dose 60+ series) Never done     Flu Vaccine (1) 09/01/2023     COVID-19 Vaccine (5 - 2023-24 season) 09/01/2023     ANNUAL REVIEW OF HM ORDERS  11/15/2023     Annual Wellness Visit  11/15/2023     Hearing Loss: Care Instructions  Overview     Hearing loss is a sudden or slow decrease in how well you hear. It can range from slight to profound. Permanent hearing loss can occur with aging. It also can happen when you are exposed long-term to loud noise. Examples include listening to loud music, riding motorcycles, or being around other loud machines.  Hearing loss can affect your work and home life. It can make you feel lonely or depressed. You may feel that you have lost your independence. But hearing aids and other devices can help you hear better and feel connected to others.  Follow-up care is a key part of your treatment and safety. Be sure to make and go to all appointments, and call your doctor if you are having problems. It's also a good idea to know your test results and keep a list of the medicines you take.  How can you care for yourself at home?  Avoid loud noises whenever possible. This helps keep your hearing from getting worse.  Always wear hearing protection around loud noises.  Wear a hearing aid as directed.  A professional can help you pick a hearing aid that will work best for you.  You can also get hearing aids over the counter for mild to moderate hearing loss.  Have hearing tests as your doctor suggests. They can show whether your hearing has changed. Your  "hearing aid may need to be adjusted.  Use other devices as needed. These may include:  Telephone amplifiers and hearing aids that can connect to a television, stereo, radio, or microphone.  Devices that use lights or vibrations. These alert you to the doorbell, a ringing telephone, or a baby monitor.  Television closed-captioning. This shows the words at the bottom of the screen. Most new TVs can do this.  TTY (text telephone). This lets you type messages back and forth on the telephone instead of talking or listening. These devices are also called TDD. When messages are typed on the keyboard, they are sent over the phone line to a receiving TTY. The message is shown on a monitor.  Use text messaging, social media, and email if it is hard for you to communicate by telephone.  Try to learn a listening technique called speechreading. It is not lipreading. You pay attention to people's gestures, expressions, posture, and tone of voice. These clues can help you understand what a person is saying. Face the person you are talking to, and have them face you. Make sure the lighting is good. You need to see the other person's face clearly.  Think about counseling if you need help to adjust to your hearing loss.  When should you call for help?  Watch closely for changes in your health, and be sure to contact your doctor if:    You think your hearing is getting worse.     You have new symptoms, such as dizziness or nausea.   Where can you learn more?  Go to [email or web address]  Enter R798 in the search box to learn more about \"Hearing Loss: Care Instructions.\"  Current as of: February 28, 2023               Content Version: 13.8    5832-1936 [employer].   Care instructions adapted under license by your healthcare professional. If you have questions about a medical condition or this instruction, always ask your healthcare professional. [employer] disclaims any warranty or liability " for your use of this information.

## 2023-11-20 NOTE — PROGRESS NOTES
"The patient was provided with written information regarding signs of hearing loss.  Answers submitted by the patient for this visit:  Annual Preventive Visit (Submitted on 11/20/2023)  Chief Complaint: Annual Exam:  In general, how would you rate your overall physical health?: good  Frequency of exercise:: 2-3 days/week  Do you usually eat at least 4 servings of fruit and vegetables a day, include whole grains & fiber, and avoid regularly eating high fat or \"junk\" foods? : Yes  Taking medications regularly:: Yes  Medication side effects:: None  Activities of Daily Living: no assistance needed  Home safety: throw rugs in the hallway, lack of grab bars in the bathroom  Hearing Impairment:: difficulty following a conversation in a noisy restaurant or crowded room, feel that people are mumbling or not speaking clearly, find that men's voices are easier to understand than woman's  In the past 6 months, have you been bothered by leaking of urine?: No  hearing loss: Yes  impotence: Yes  penile discharge: No  In general, how would you rate your overall mental or emotional health?: good  Additional concerns today:: No  Exercise outside of work (Submitted on 11/20/2023)  Chief Complaint: Annual Exam:  Duration of exercise:: 45-60 minutes    "

## 2023-11-27 DIAGNOSIS — Z85.46 HISTORY OF MALIGNANT NEOPLASM OF PROSTATE: ICD-10-CM

## 2023-11-27 RX ORDER — SILDENAFIL CITRATE 20 MG/1
20 TABLET ORAL DAILY PRN
Qty: 60 TABLET | Refills: 0 | Status: SHIPPED | OUTPATIENT
Start: 2023-11-27

## 2023-12-13 ENCOUNTER — PATIENT OUTREACH (OUTPATIENT)
Dept: GASTROENTEROLOGY | Facility: CLINIC | Age: 68
End: 2023-12-13
Payer: MEDICARE

## 2023-12-20 ENCOUNTER — TELEPHONE (OUTPATIENT)
Dept: FAMILY MEDICINE | Facility: CLINIC | Age: 68
End: 2023-12-20
Payer: MEDICARE

## 2023-12-20 DIAGNOSIS — Z13.1 SCREENING FOR DIABETES MELLITUS: Primary | ICD-10-CM

## 2023-12-20 NOTE — TELEPHONE ENCOUNTER
Order/Referral Request    Who is requesting: Patient    Orders being requested: Labs that did not get done at px visit. Blood Sugar and other labs not done at appt.    Reason service is needed/diagnosis: yearly px    When are orders needed by: a week    Has this been discussed with Provider: No-patient thought these would be done at patients preventative visit    Does patient have a preference on a Group/Provider/Facility? [facility]    Does patient have an appointment scheduled?: No    Where to send orders: Place orders within Epic    Could we send this information to you in [facility] or would you prefer to receive a phone call?:   Patient would prefer a phone call   Okay to leave a detailed message?: Yes at Cell number on file:    Telephone Information:   Mobile [phone]

## 2024-02-12 ENCOUNTER — TRANSFERRED RECORDS (OUTPATIENT)
Dept: HEALTH INFORMATION MANAGEMENT | Facility: CLINIC | Age: 69
End: 2024-02-12
Payer: COMMERCIAL

## 2024-02-26 ENCOUNTER — OFFICE VISIT (OUTPATIENT)
Dept: FAMILY MEDICINE | Facility: CLINIC | Age: 69
End: 2024-02-26
Payer: COMMERCIAL

## 2024-02-26 VITALS
RESPIRATION RATE: 16 BRPM | DIASTOLIC BLOOD PRESSURE: 80 MMHG | WEIGHT: 165.6 LBS | BODY MASS INDEX: 26.61 KG/M2 | HEIGHT: 66 IN | HEART RATE: 62 BPM | TEMPERATURE: 97.4 F | SYSTOLIC BLOOD PRESSURE: 127 MMHG | OXYGEN SATURATION: 98 %

## 2024-02-26 DIAGNOSIS — M48.02 SPINAL STENOSIS OF CERVICAL REGION: Primary | ICD-10-CM

## 2024-02-26 DIAGNOSIS — Z13.1 SCREENING FOR DIABETES MELLITUS: ICD-10-CM

## 2024-02-26 DIAGNOSIS — E78.5 HYPERLIPIDEMIA, UNSPECIFIED HYPERLIPIDEMIA TYPE: ICD-10-CM

## 2024-02-26 LAB
ALBUMIN SERPL BCG-MCNC: 4.4 G/DL (ref 3.5–5.2)
ALP SERPL-CCNC: 63 U/L (ref 40–150)
ALT SERPL W P-5'-P-CCNC: 15 U/L (ref 0–70)
ANION GAP SERPL CALCULATED.3IONS-SCNC: 11 MMOL/L (ref 7–15)
AST SERPL W P-5'-P-CCNC: 20 U/L (ref 0–45)
BILIRUB SERPL-MCNC: 0.5 MG/DL
BUN SERPL-MCNC: 9.4 MG/DL (ref 8–23)
CALCIUM SERPL-MCNC: 9.5 MG/DL (ref 8.8–10.2)
CHLORIDE SERPL-SCNC: 102 MMOL/L (ref 98–107)
CREAT SERPL-MCNC: 0.73 MG/DL (ref 0.67–1.17)
DEPRECATED HCO3 PLAS-SCNC: 25 MMOL/L (ref 22–29)
EGFRCR SERPLBLD CKD-EPI 2021: >90 ML/MIN/1.73M2
FASTING STATUS PATIENT QL REPORTED: ABNORMAL
GLUCOSE SERPL-MCNC: 110 MG/DL (ref 70–99)
GLUCOSE SERPL-MCNC: 110 MG/DL (ref 70–99)
POTASSIUM SERPL-SCNC: 4.8 MMOL/L (ref 3.4–5.3)
PROT SERPL-MCNC: 7.7 G/DL (ref 6.4–8.3)
SODIUM SERPL-SCNC: 138 MMOL/L (ref 135–145)

## 2024-02-26 PROCEDURE — 99213 OFFICE O/P EST LOW 20 MIN: CPT | Performed by: FAMILY MEDICINE

## 2024-02-26 PROCEDURE — 80053 COMPREHEN METABOLIC PANEL: CPT | Performed by: FAMILY MEDICINE

## 2024-02-26 PROCEDURE — 36415 COLL VENOUS BLD VENIPUNCTURE: CPT | Performed by: FAMILY MEDICINE

## 2024-02-26 NOTE — PROGRESS NOTES
"  Assessment & Plan     Spinal stenosis of cervical region  Patient has had minimal to no improvement with physical therapy.  Continues to have numbness in the C7/C8 dermatome.  He denies any significant weakness.  He has not been dropping any objects.  Set up MRI of his cervical spine and we will determine future referrals pending the results.  - MR Cervical Spine w/o Contrast; Future    Hyperlipidemia, unspecified hyperlipidemia type    - Comprehensive metabolic panel; Future      Screening for diabetes mellitus    - Glucose            BMI  Estimated body mass index is 26.73 kg/m  as calculated from the following:    Height as of this encounter: 1.676 m (5' 6\").    Weight as of this encounter: 75.1 kg (165 lb 9.6 oz).             Subjective   Freeman is a 68 year old, presenting for the following health issues:  Musculoskeletal Problem (Follow up left arm numbness, has been going to PT.   Also noticed some left foot involvement) and Follow Up (Also wants CMP done, was to have been done at last [facility].)        2/26/2024    11:04 AM   Additional Questions   Roomed by Maxine RICHARD CMA   Accompanied by Self     History of Present Illness       Reason for visit:  Arm numbness    He eats 2-3 servings of fruits and vegetables daily.He consumes 0 sweetened beverage(s) daily. He exercises with enough effort to increase his heart rate 3 or less days per week.   He is taking medications regularly.                   Objective    /80 (BP Location: Right arm, Patient Position: Sitting, Cuff Size: Adult Large)   Pulse 62   Temp 97.4  F (36.3  C) (Tympanic)   Resp 16   Ht 1.676 m (5' 6\")   Wt 75.1 kg (165 lb 9.6 oz)   SpO2 98%   BMI 26.73 kg/m    Body mass index is 26.73 kg/m .  Physical Exam               Signed Electronically by: Juan Jasmine MD    "

## 2024-03-08 ENCOUNTER — HOSPITAL ENCOUNTER (OUTPATIENT)
Dept: MRI IMAGING | Facility: CLINIC | Age: 69
Discharge: HOME | End: 2024-03-08
Attending: FAMILY MEDICINE | Admitting: FAMILY MEDICINE
Payer: MEDICARE

## 2024-03-08 DIAGNOSIS — M48.02 SPINAL STENOSIS OF CERVICAL REGION: ICD-10-CM

## 2024-03-08 PROCEDURE — 72141 MRI NECK SPINE W/O DYE: CPT | Mod: MF

## 2024-03-11 ENCOUNTER — OFFICE VISIT (OUTPATIENT)
Dept: FAMILY MEDICINE | Facility: CLINIC | Age: 69
End: 2024-03-11
Payer: COMMERCIAL

## 2024-03-11 VITALS
SYSTOLIC BLOOD PRESSURE: 139 MMHG | RESPIRATION RATE: 16 BRPM | HEART RATE: 59 BPM | DIASTOLIC BLOOD PRESSURE: 86 MMHG | BODY MASS INDEX: 26.71 KG/M2 | OXYGEN SATURATION: 97 % | WEIGHT: 166.2 LBS | TEMPERATURE: 96.7 F | HEIGHT: 66 IN

## 2024-03-11 DIAGNOSIS — H81.11 BENIGN PAROXYSMAL POSITIONAL VERTIGO OF RIGHT EAR: ICD-10-CM

## 2024-03-11 DIAGNOSIS — M48.02 SPINAL STENOSIS OF CERVICAL REGION: Primary | ICD-10-CM

## 2024-03-11 PROCEDURE — 99213 OFFICE O/P EST LOW 20 MIN: CPT | Performed by: FAMILY MEDICINE

## 2024-03-11 NOTE — PROGRESS NOTES
"  Assessment & Plan     Spinal stenosis of cervical region  We reviewed his MRI.  He has multilevel neuroforaminal and central canal stenosis.  Degenerative disks are present.  Referral to nonoperative spine surgery.  - Spine  Referral; Future    Benign paroxysmal positional vertigo of right ear  Acute onset of positional vertigo occurred 3 days ago.  He reports he is better today.  Discussed etiology of the condition and home Epley maneuver.            BMI  Estimated body mass index is 26.83 kg/m  as calculated from the following:    Height as of this encounter: 1.676 m (5' 6\").    Weight as of this encounter: 75.4 kg (166 lb 3.2 oz).             Subjective   Freeman is a 68 year old, presenting for the following health issues:  Dizziness (C/o dizziness/vertigo since last Thursday.) and Follow Up (Discuss MRI results from last Friday.)      3/11/2024    11:09 AM   Additional Questions   Roomed by Maxine RICHARD CMA   Accompanied by Spouse     History of Present Illness       Reason for visit:  Arm numbness    He eats 2-3 servings of fruits and vegetables daily.He consumes 0 sweetened beverage(s) daily. He exercises with enough effort to increase his heart rate 3 or less days per week.   He is taking medications regularly.     Patient is here for follow-up on his upper extremity radicular symptoms.  MRI shows diffuse spinal stenosis and central canal stenosis.    He developed positional vertigo last week.  He reports his symptoms are better.  He still has some discomfort when he rolls to his right side.  No other neurologic symptoms reported.  He has not had any recent upper respiratory illness.              Objective    /86 (BP Location: Right arm, Patient Position: Sitting, Cuff Size: Adult Regular)   Pulse 59   Temp (!) 96.7  F (35.9  C) (Tympanic)   Resp 16   Ht 1.676 m (5' 6\")   Wt 75.4 kg (166 lb 3.2 oz)   SpO2 97%   BMI 26.83 kg/m    Body mass index is 26.83 kg/m .  Physical Exam   Alert, oriented, " no acute distress  Ear canals patent, TMs clear  Eye exam reveals normal extraocular movements with nystagmus on right gaze  Neck is supple  Lungs clear  Normal heart rate  Neurologic exam is nonfocal with negative Romberg            Signed Electronically by: Juan aJsmine MD

## 2024-03-21 ENCOUNTER — TRANSFERRED RECORDS (OUTPATIENT)
Dept: HEALTH INFORMATION MANAGEMENT | Facility: CLINIC | Age: 69
End: 2024-03-21
Payer: COMMERCIAL

## 2024-11-21 ENCOUNTER — OFFICE VISIT (OUTPATIENT)
Dept: FAMILY MEDICINE | Facility: CLINIC | Age: 69
End: 2024-11-21
Payer: COMMERCIAL

## 2024-11-21 VITALS
RESPIRATION RATE: 16 BRPM | BODY MASS INDEX: 26.31 KG/M2 | WEIGHT: 163.7 LBS | HEIGHT: 66 IN | OXYGEN SATURATION: 97 % | SYSTOLIC BLOOD PRESSURE: 135 MMHG | HEART RATE: 60 BPM | TEMPERATURE: 97.3 F | DIASTOLIC BLOOD PRESSURE: 85 MMHG

## 2024-11-21 DIAGNOSIS — M67.40 GANGLION CYST: Primary | ICD-10-CM

## 2024-11-21 DIAGNOSIS — H61.23 EXCESSIVE CERUMEN IN EAR CANAL, BILATERAL: ICD-10-CM

## 2024-11-21 DIAGNOSIS — E78.2 MIXED HYPERLIPIDEMIA: ICD-10-CM

## 2024-11-21 NOTE — PROGRESS NOTES
"  Assessment & Plan     Ganglion cyst  Patient has a mildly symptomatic ganglion cyst on his left hand.  We have discussed treatment with aspiration, observation, orthopedic referral.  He would like to keep an eye on this for now.  If becomes more symptomatic he will let us know.    Excessive cerumen in ear canal, bilateral  Your canals were cleared of cerumen with a lighted curette.  He tolerated this well.  TMs normal    Mixed hyperlipidemia  Future orders placed for lipid panel          BMI  Estimated body mass index is 26.42 kg/m  as calculated from the following:    Height as of this encounter: 1.676 m (5' 6\").    Weight as of this encounter: 74.3 kg (163 lb 11.2 oz).             Subjective   Freeman is a 69 year old, presenting for the following health issues:  Musculoskeletal Problem (Check lump on left hand, between index finger and thumb, increased pain, noticed about 2 months ago)      11/21/2024    10:21 AM   Additional Questions   Roomed by Maxine RICHARD CMA   Accompanied by Self     Musculoskeletal Problem    History of Present Illness       Reason for visit:  Painful nodule on left hand.   He is taking medications regularly.     Patient is here with concerns about a nodule on his left hand.  It is distal to the first CMC joint.  He reports it is intermittently tender.  When he works out in the gym it causes some mild discomfort.  He denies any recent injury.  He has history of excessive cerumen in his ear canals.  He would like this checked today.    He is due for his wellness exam soon.            Objective    /85 (BP Location: Right arm, Patient Position: Sitting, Cuff Size: Adult Large)   Pulse 60   Temp 97.3  F (36.3  C) (Tympanic)   Resp 16   Ht 1.676 m (5' 6\")   Wt 74.3 kg (163 lb 11.2 oz)   SpO2 97%   BMI 26.42 kg/m    Body mass index is 26.42 kg/m .  Physical Exam   Alert, oriented, no acute distress  Ear canals are mildly ceruminous  Neck supple  Normal heart rate  Nonlabored " breathing  Exam left hand reveals a 1 cm mobile, cystic structure distal to the first CMC joint on the left hand.  It is nontender to palpation.            Signed Electronically by: Juan Jasmine MD

## 2025-01-14 ENCOUNTER — OFFICE VISIT (OUTPATIENT)
Dept: FAMILY MEDICINE | Facility: CLINIC | Age: 70
End: 2025-01-14
Payer: COMMERCIAL

## 2025-01-14 ENCOUNTER — ANCILLARY PROCEDURE (OUTPATIENT)
Dept: GENERAL RADIOLOGY | Facility: CLINIC | Age: 70
End: 2025-01-14
Attending: NURSE PRACTITIONER
Payer: COMMERCIAL

## 2025-01-14 VITALS
OXYGEN SATURATION: 97 % | HEART RATE: 68 BPM | SYSTOLIC BLOOD PRESSURE: 122 MMHG | BODY MASS INDEX: 26.16 KG/M2 | RESPIRATION RATE: 16 BRPM | HEIGHT: 66 IN | TEMPERATURE: 97.5 F | DIASTOLIC BLOOD PRESSURE: 76 MMHG | WEIGHT: 162.8 LBS

## 2025-01-14 DIAGNOSIS — R05.1 ACUTE COUGH: Primary | ICD-10-CM

## 2025-01-14 DIAGNOSIS — C61 MALIGNANT NEOPLASM OF PROSTATE (H): ICD-10-CM

## 2025-01-14 DIAGNOSIS — R05.1 ACUTE COUGH: ICD-10-CM

## 2025-01-14 DIAGNOSIS — J20.9 ACUTE BRONCHITIS, UNSPECIFIED ORGANISM: ICD-10-CM

## 2025-01-14 PROCEDURE — 99214 OFFICE O/P EST MOD 30 MIN: CPT | Performed by: NURSE PRACTITIONER

## 2025-01-14 PROCEDURE — 71046 X-RAY EXAM CHEST 2 VIEWS: CPT | Mod: TC | Performed by: RADIOLOGY

## 2025-01-14 RX ORDER — ALBUTEROL SULFATE 90 UG/1
2 INHALANT RESPIRATORY (INHALATION) EVERY 6 HOURS PRN
Qty: 18 G | Refills: 0 | Status: SHIPPED | OUTPATIENT
Start: 2025-01-14

## 2025-01-14 RX ORDER — BENZONATATE 100 MG/1
100 CAPSULE ORAL 3 TIMES DAILY PRN
Qty: 21 CAPSULE | Refills: 0 | Status: SHIPPED | OUTPATIENT
Start: 2025-01-14 | End: 2025-01-21

## 2025-01-14 RX ORDER — AZITHROMYCIN 250 MG/1
TABLET, FILM COATED ORAL
Qty: 6 TABLET | Refills: 0 | Status: SHIPPED | OUTPATIENT
Start: 2025-01-14 | End: 2025-01-19

## 2025-01-14 ASSESSMENT — ENCOUNTER SYMPTOMS: COUGH: 1

## 2025-01-14 NOTE — PROGRESS NOTES
"  Assessment & Plan     Acute bronchitis, unspecified organism  Slight crackle of left lower lobe, on my review chest x-ray looks okay.  Waiting on radiology overread.  Will treat for orchitis with albuterol and azithromycin, covering for possible developing pneumonia.  If chest x-ray over read confirms pneumonia, would add on additional antibiotic.  Follow-up if symptoms not improving over the next 72 hours.  - albuterol (PROAIR HFA/PROVENTIL HFA/VENTOLIN HFA) 108 (90 Base) MCG/ACT inhaler; Inhale 2 puffs into the lungs every 6 hours as needed for shortness of breath, wheezing or cough.  - azithromycin (ZITHROMAX) 250 MG tablet; Take 2 tablets (500 mg) by mouth daily for 1 day, THEN 1 tablet (250 mg) daily for 4 days.    Acute cough  Tessalon Perles and albuterol inhaler for cough.  - XR Chest 2 Views; Future  - benzonatate (TESSALON) 100 MG capsule; Take 1 capsule (100 mg) by mouth 3 times daily as needed for cough.  - albuterol (PROAIR HFA/PROVENTIL HFA/VENTOLIN HFA) 108 (90 Base) MCG/ACT inhaler; Inhale 2 puffs into the lungs every 6 hours as needed for shortness of breath, wheezing or cough.    Malignant neoplasm of prostate (H)   Followed by [facility].  Last office note reviewed.  Prostatectomy 9/20/2015, undetectable PSA in 2024.  Erectile dysfunction from prostatectomy.  Further PSA surveillance deferred to PCP.    BMI  Estimated body mass index is 26.28 kg/m  as calculated from the following:    Height as of this encounter: 1.676 m (5' 6\").    Weight as of this encounter: 73.8 kg (162 lb 12.8 oz).             Sangita Millard is a 69 year old, presenting for the following health issues:  Cough (Cough x 5 days, pt had influenza last week and now has a cough)        1/14/2025     9:24 AM   Additional Questions   Roomed by CHASE Olsen     Freeman is a very pleasant 69-year-old male with history of prostate cancer and hyperlipidemia presents today for cough.  He had likely influenza with bodyaches, low-grade " "fever and nausea last week with cough developing 5 days ago.  His other symptoms have resolved but cough persists.  He has slight productive cough but primarily a dry cough.  Sometimes can cough for 10 to 15 seconds.  No posttussive emesis.  Taking Sudafed for nasal congestion which has primarily resolved.      History of Present Illness       Reason for visit:  Cough  Symptom onset:  3-7 days ago  Symptoms include:  Coughing  Symptom intensity:  Moderate  Symptom progression:  Staying the same  Had these symptoms before:  No  What makes it worse:  Lying down   He is taking medications regularly.                 Review of Systems  Constitutional, HEENT, cardiovascular, pulmonary, gi and gu systems are negative, except as otherwise noted.      Objective    /76 (BP Location: Right arm, Patient Position: Sitting, Cuff Size: Adult Regular)   Pulse 68   Temp 97.5  F (36.4  C) (Tympanic)   Resp 16   Ht 1.676 m (5' 6\")   Wt 73.8 kg (162 lb 12.8 oz)   SpO2 97%   BMI 26.28 kg/m    Body mass index is 26.28 kg/m .  Physical Exam  Constitutional:       General: He is not in acute distress.     Appearance: He is ill-appearing. He is not toxic-appearing.   HENT:      Head: Normocephalic and atraumatic.      Mouth/Throat:      Mouth: Mucous membranes are moist.      Pharynx: No oropharyngeal exudate or posterior oropharyngeal erythema.   Cardiovascular:      Rate and Rhythm: Normal rate and regular rhythm.   Pulmonary:      Effort: No respiratory distress.      Breath sounds: Rales (slight crackles left lower lobe) present. No wheezing or rhonchi.   Musculoskeletal:      Cervical back: Neck supple.   Lymphadenopathy:      Cervical: No cervical adenopathy.   Neurological:      Mental Status: He is alert and oriented to person, place, and time.   Psychiatric:         Mood and Affect: Mood normal.         Behavior: Behavior normal.                    Signed Electronically by: MOISES Ovalle CNP    "

## 2025-01-18 ENCOUNTER — HEALTH MAINTENANCE LETTER (OUTPATIENT)
Age: 70
End: 2025-01-18

## 2025-02-07 DIAGNOSIS — E78.5 HYPERLIPIDEMIA, UNSPECIFIED HYPERLIPIDEMIA TYPE: ICD-10-CM

## 2025-02-10 ENCOUNTER — DOCUMENTATION ONLY (OUTPATIENT)
Dept: FAMILY MEDICINE | Facility: CLINIC | Age: 70
End: 2025-02-10
Payer: COMMERCIAL

## 2025-02-10 DIAGNOSIS — E78.2 MIXED HYPERLIPIDEMIA: ICD-10-CM

## 2025-02-10 DIAGNOSIS — C61 MALIGNANT NEOPLASM OF PROSTATE (H): Primary | ICD-10-CM

## 2025-02-10 RX ORDER — ATORVASTATIN CALCIUM 40 MG/1
40 TABLET, FILM COATED ORAL EVERY MORNING
Qty: 90 TABLET | Refills: 3 | Status: SHIPPED | OUTPATIENT
Start: 2025-02-10

## 2025-02-10 NOTE — PROGRESS NOTES
This patient is coming in for a lab only appointment on February 12th and mentioned comp and cholesterol. There is a lipid order but if you would like other testing please place an order. Thanks.

## 2025-02-12 ENCOUNTER — LAB (OUTPATIENT)
Dept: LAB | Facility: CLINIC | Age: 70
End: 2025-02-12
Payer: COMMERCIAL

## 2025-02-12 DIAGNOSIS — E78.2 MIXED HYPERLIPIDEMIA: ICD-10-CM

## 2025-02-12 DIAGNOSIS — C61 MALIGNANT NEOPLASM OF PROSTATE (H): ICD-10-CM

## 2025-02-12 LAB
ANION GAP SERPL CALCULATED.3IONS-SCNC: 13 MMOL/L (ref 7–15)
BUN SERPL-MCNC: 9.5 MG/DL (ref 8–23)
CALCIUM SERPL-MCNC: 9.3 MG/DL (ref 8.8–10.4)
CHLORIDE SERPL-SCNC: 101 MMOL/L (ref 98–107)
CHOLEST SERPL-MCNC: 174 MG/DL
CREAT SERPL-MCNC: 0.74 MG/DL (ref 0.67–1.17)
EGFRCR SERPLBLD CKD-EPI 2021: >90 ML/MIN/1.73M2
FASTING STATUS PATIENT QL REPORTED: ABNORMAL
FASTING STATUS PATIENT QL REPORTED: NORMAL
GLUCOSE SERPL-MCNC: 106 MG/DL (ref 70–99)
HCO3 SERPL-SCNC: 24 MMOL/L (ref 22–29)
HDLC SERPL-MCNC: 77 MG/DL
LDLC SERPL CALC-MCNC: 86 MG/DL
NONHDLC SERPL-MCNC: 97 MG/DL
POTASSIUM SERPL-SCNC: 4.5 MMOL/L (ref 3.4–5.3)
PSA SERPL DL<=0.01 NG/ML-MCNC: <0.01 NG/ML (ref 0–6.5)
SODIUM SERPL-SCNC: 138 MMOL/L (ref 135–145)
TRIGL SERPL-MCNC: 54 MG/DL

## 2025-02-12 PROCEDURE — 80048 BASIC METABOLIC PNL TOTAL CA: CPT

## 2025-02-12 PROCEDURE — 36415 COLL VENOUS BLD VENIPUNCTURE: CPT

## 2025-02-12 PROCEDURE — 84153 ASSAY OF PSA TOTAL: CPT

## 2025-02-12 PROCEDURE — 80061 LIPID PANEL: CPT

## 2025-02-17 ENCOUNTER — OFFICE VISIT (OUTPATIENT)
Dept: FAMILY MEDICINE | Facility: CLINIC | Age: 70
End: 2025-02-17
Payer: COMMERCIAL

## 2025-02-17 VITALS
BODY MASS INDEX: 26.21 KG/M2 | SYSTOLIC BLOOD PRESSURE: 123 MMHG | HEIGHT: 66 IN | HEART RATE: 59 BPM | DIASTOLIC BLOOD PRESSURE: 75 MMHG | RESPIRATION RATE: 16 BRPM | TEMPERATURE: 96.3 F | OXYGEN SATURATION: 99 % | WEIGHT: 163.1 LBS

## 2025-02-17 DIAGNOSIS — C61 PROSTATE CANCER (H): ICD-10-CM

## 2025-02-17 DIAGNOSIS — E78.2 MIXED HYPERLIPIDEMIA: ICD-10-CM

## 2025-02-17 DIAGNOSIS — Z00.00 ENCOUNTER FOR MEDICARE ANNUAL WELLNESS EXAM: Primary | ICD-10-CM

## 2025-02-17 PROCEDURE — 90480 ADMN SARSCOV2 VAC 1/ONLY CMP: CPT | Performed by: FAMILY MEDICINE

## 2025-02-17 PROCEDURE — G0439 PPPS, SUBSEQ VISIT: HCPCS | Performed by: FAMILY MEDICINE

## 2025-02-17 PROCEDURE — G2211 COMPLEX E/M VISIT ADD ON: HCPCS | Performed by: FAMILY MEDICINE

## 2025-02-17 PROCEDURE — 91320 SARSCV2 VAC 30MCG TRS-SUC IM: CPT | Performed by: FAMILY MEDICINE

## 2025-02-17 PROCEDURE — 99213 OFFICE O/P EST LOW 20 MIN: CPT | Mod: 25 | Performed by: FAMILY MEDICINE

## 2025-02-17 RX ORDER — CHOLECALCIFEROL (VITAMIN D3) 50 MCG
1 TABLET ORAL DAILY
COMMUNITY

## 2025-02-17 SDOH — HEALTH STABILITY: PHYSICAL HEALTH: ON AVERAGE, HOW MANY MINUTES DO YOU ENGAGE IN EXERCISE AT THIS LEVEL?: 30 MIN

## 2025-02-17 SDOH — HEALTH STABILITY: PHYSICAL HEALTH: ON AVERAGE, HOW MANY DAYS PER WEEK DO YOU ENGAGE IN MODERATE TO STRENUOUS EXERCISE (LIKE A BRISK WALK)?: 3 DAYS

## 2025-02-17 ASSESSMENT — SOCIAL DETERMINANTS OF HEALTH (SDOH): HOW OFTEN DO YOU GET TOGETHER WITH FRIENDS OR RELATIVES?: ONCE A WEEK

## 2025-02-17 NOTE — PROGRESS NOTES
"Preventive Care Visit  [facility]  Juan Jasmine MD, Family Medicine  Feb 17, 2025      Assessment & Plan     Encounter for Medicare annual wellness exam  We have discussed health maintenance, routine follow-up, immunizations, heart healthy diet, regular activity, weight maintenance.     Mixed hyperlipidemia  Controlled, continue current medication    Prostate cancer (H)  PSA remains non-detectable    Patient has been advised of split billing requirements and indicates understanding: Yes        BMI  Estimated body mass index is 26.33 kg/m  as calculated from the following:    Height as of this encounter: 1.676 m (5' 6\").    Weight as of this encounter: 74 kg (163 lb 1.6 oz).       Counseling  Appropriate preventive services were addressed with this patient via screening, questionnaire, or discussion as appropriate for fall prevention, nutrition, physical activity, Tobacco-use cessation, social engagement, weight loss and cognition.  Checklist reviewing preventive services available has been given to the patient.  Reviewed patient's diet, addressing concerns and/or questions.   He is at risk for lack of exercise and has been provided with information to increase physical activity for the benefit of his well-being.   Patient reported safety concerns were addressed today.The patient was provided with written information regarding signs of hearing loss.           Sangita Millard is a 69 year old, presenting for the following:  Wellness Visit (AWV, review labs)        2/17/2025    12:06 PM   Additional Questions   Roomed by Maxine RICHARD CMA   Accompanied by Self           HPI  Patient is here for health maintenance exam.  He has been doing well over the last year.  He tolerates atorvastatin for control of his hyperlipidemia.  He has been seeing urology for follow-up on his prostate cancer.  PSA has been undetectable.        Health Care Directive  Patient does not have a Health Care Directive: " Patient states has Advance Directive and will bring in a copy to clinic.      2/17/2025   General Health   How would you rate your overall physical health? Good   Feel stress (tense, anxious, or unable to sleep) Only a little   (!) STRESS CONCERN      2/17/2025   Nutrition   Diet: Regular (no restrictions)         2/17/2025   Exercise   Days per week of moderate/strenous exercise 3 days   Average minutes spent exercising at this level 30 min         2/17/2025   Social Factors   Frequency of gathering with friends or relatives Once a week   Worry food won't last until get money to buy more No   Food not last or not have enough money for food? No   Do you have housing? (Housing is defined as stable permanent housing and does not include staying ouside in a car, in a tent, in an abandoned building, in an overnight shelter, or couch-surfing.) Yes   Are you worried about losing your housing? No   Lack of transportation? No   Unable to get utilities (heat,electricity)? No         2/17/2025   Fall Risk   Fallen 2 or more times in the past year? No   Trouble with walking or balance? No          2/17/2025   Activities of Daily Living- Home Safety   Needs help with the following daily activites None of the above   Safety concerns in the home Throw rugs in the hallway    No grab bars in the bathroom       Multiple values from one day are sorted in reverse-chronological order         2/17/2025   Dental   Dentist two times every year? Yes         2/17/2025   Hearing Screening   Hearing concerns? (!) IT'S HARDER TO UNDERSTAND WOMEN'S VOICES THAN MEN'S VOICES.    (!) IT'S HARD TO FOLLOW A CONVERSATION IN A NOISY RESTAURANT OR CROWDED ROOM.       Multiple values from one day are sorted in reverse-chronological order         2/17/2025   Driving Risk Screening   Patient/family members have concerns about driving No         2/17/2025   General Alertness/Fatigue Screening   Have you been more tired than usual lately? No          2/17/2025   Urinary Incontinence Screening   Bothered by leaking urine in past 6 months No            Today's PHQ-2 Score:       2/17/2025     9:22 AM   PHQ-2 ( 1999 [employer])   Q1: Little interest or pleasure in doing things 0   Q2: Feeling down, depressed or hopeless 0   PHQ-2 Score 0    Q1: Little interest or pleasure in doing things Not at all   Q2: Feeling down, depressed or hopeless Not at all   PHQ-2 Score 0       Patient-reported           2/17/2025   Substance Use   Alcohol more than 3/day or more than 7/wk No   Do you have a current opioid prescription? No   How severe/bad is pain from 1 to 10? 0/10 (No Pain)   Do you use any other substances recreationally? No     Social History     Tobacco Use    Smoking status: Never     Passive exposure: Never    Smokeless tobacco: Never   Vaping Use    Vaping status: Never Used   Substance Use Topics    Alcohol use: Yes     Comment: Alcoholic Drinks/day: occasional    Drug use: No           2/17/2025   AAA Screening   Family history of Abdominal Aortic Aneurysm (AAA)? (!) YES    ASCVD Risk   The 10-year ASCVD risk score (Avinash DK, et al., 2019) is: 13.1%    Values used to calculate the score:      Age: 69 years      Sex: Male      Is Non- : No      Diabetic: No      Tobacco smoker: No      Systolic Blood Pressure: 123 mmHg      Is BP treated: No      HDL Cholesterol: 77 mg/dL      Total Cholesterol: 174 mg/dL            Reviewed and updated as needed this visit by Provider                      Current providers sharing in care for this patient include:  Patient Care Team:  Juan Jasmine MD as PCP - General (Family Medicine)  Siddharth Lee MD (Inactive) (Family Practice)  Juan Jasmine MD as Assigned PCP    The following health maintenance items are reviewed in Epic and correct as of today:  Health Maintenance   Topic Date Due    ZOSTER IMMUNIZATION (2 of 3) 04/23/2013    COVID-19 Vaccine (6 - 2024-25 season) 09/01/2024  "   MEDICARE ANNUAL WELLNESS VISIT  11/20/2024    ANNUAL REVIEW OF HM ORDERS  11/21/2025    LIPID  02/12/2026    FALL RISK ASSESSMENT  02/17/2026    GLUCOSE  02/12/2028    COLORECTAL CANCER SCREENING  02/16/2028    DTAP/TDAP/TD IMMUNIZATION (3 - Td or Tdap) 08/28/2028    ADVANCE CARE PLANNING  11/20/2028    RSV VACCINE (1 - 1-dose 75+ series) 07/23/2029    HEPATITIS C SCREENING  Completed    PHQ-2 (once per calendar year)  Completed    INFLUENZA VACCINE  Completed    Pneumococcal Vaccine: 50+ Years  Completed    HPV IMMUNIZATION  Aged Out    MENINGITIS IMMUNIZATION  Aged Out         Review of Systems  Constitutional, neuro, ENT, endocrine, pulmonary, cardiac, gastrointestinal, genitourinary, musculoskeletal, integument and psychiatric systems are negative, except as otherwise noted.     Objective    Exam  /75 (BP Location: Right arm, Patient Position: Sitting, Cuff Size: Adult Regular)   Pulse 59   Temp (!) 96.3  F (35.7  C) (Tympanic)   Resp 16   Ht 1.676 m (5' 6\")   Wt 74 kg (163 lb 1.6 oz)   SpO2 99%   BMI 26.33 kg/m     Estimated body mass index is 26.33 kg/m  as calculated from the following:    Height as of this encounter: 1.676 m (5' 6\").    Weight as of this encounter: 74 kg (163 lb 1.6 oz).    Physical Exam  GENERAL: alert and no distress  EYES: Eyes grossly normal to inspection, PERRL and conjunctivae and sclerae normal  HENT: ear canals and TM's normal, nose and mouth without ulcers or lesions  NECK: no adenopathy, no asymmetry, masses, or scars  RESP: lungs clear to auscultation - no rales, rhonchi or wheezes  CV: regular rate and rhythm, normal S1 S2, no S3 or S4, no murmur, click or rub, no peripheral edema  ABDOMEN: soft, nontender, no hepatosplenomegaly, no masses and bowel sounds normal  MS: no gross musculoskeletal defects noted, no edema  SKIN: no suspicious lesions or rashes  NEURO: Normal strength and tone, mentation intact and speech normal  PSYCH: mentation appears normal, affect " normal/bright        2/17/2025   Mini Cog   Clock Draw Score 2 Normal   3 Item Recall 3 objects recalled   Mini Cog Total Score 5              Signed Electronically by: Juan Jasmine MD

## 2025-02-25 ENCOUNTER — TRANSFERRED RECORDS (OUTPATIENT)
Dept: HEALTH INFORMATION MANAGEMENT | Facility: CLINIC | Age: 70
End: 2025-02-25
Payer: COMMERCIAL

## 2025-06-09 DIAGNOSIS — Z85.46 HISTORY OF MALIGNANT NEOPLASM OF PROSTATE: ICD-10-CM

## 2025-06-09 RX ORDER — SILDENAFIL CITRATE 20 MG/1
TABLET ORAL
Qty: 60 TABLET | Refills: 0 | Status: SHIPPED | OUTPATIENT
Start: 2025-06-09 | End: 2025-06-12

## 2025-06-10 ENCOUNTER — TELEPHONE (OUTPATIENT)
Dept: FAMILY MEDICINE | Facility: CLINIC | Age: 70
End: 2025-06-10
Payer: COMMERCIAL

## 2025-06-10 NOTE — TELEPHONE ENCOUNTER
Central Prior Authorization Team - Phone: [phone]     PRIOR AUTHORIZATION DENIED    Medication: SILDENAFIL CITRATE 20 MG PO TABS  Insurance Company: [employer] - Phone [phone] Fax [phone]  Denial Date: 6/10/2025  Denial Reason(s): not covered for diagnosis      Appeal Information:       Patient Notified: NO  Unfortunately, we cannot call the patient with denials because we do not know what next steps the MD will take nor can we give medical advice, please notify the patient of what they are to expect for the continuation of their therapy from the provider.

## 2025-06-10 NOTE — TELEPHONE ENCOUNTER
VM left for pt, requested return call to inform prior authorization for sildenafil has been denied by insurance.

## 2025-06-12 ENCOUNTER — TELEPHONE (OUTPATIENT)
Dept: FAMILY MEDICINE | Facility: CLINIC | Age: 70
End: 2025-06-12
Payer: COMMERCIAL

## 2025-06-12 DIAGNOSIS — Z85.46 HISTORY OF MALIGNANT NEOPLASM OF PROSTATE: ICD-10-CM

## 2025-06-12 RX ORDER — SILDENAFIL CITRATE 20 MG/1
20 TABLET ORAL DAILY PRN
Qty: 60 TABLET | Refills: 5 | Status: SHIPPED | OUTPATIENT
Start: 2025-06-12

## 2025-06-12 NOTE — TELEPHONE ENCOUNTER
General Call      Reason for Call:  medication question    What are your questions or concerns:  pt states he was told his renewal for sildenafil has been denied by PCP. Informed prior authorization has been denied by insurance and pt will need to pay out of pocket for the medication. Informed on 6/9/25, PCP ordered sildenafil and was sent to [facility]. Pt will call pharmacy for prescription. Pt is requesting additional refills to be sent as well.     Date of last appointment with provider: 2/17/25

## 2025-08-10 ENCOUNTER — HEALTH MAINTENANCE LETTER (OUTPATIENT)
Age: 70
End: 2025-08-10

## 2025-08-20 ENCOUNTER — OFFICE VISIT (OUTPATIENT)
Dept: FAMILY MEDICINE | Facility: CLINIC | Age: 70
End: 2025-08-20
Payer: COMMERCIAL

## 2025-08-20 VITALS
BODY MASS INDEX: 25.44 KG/M2 | RESPIRATION RATE: 16 BRPM | TEMPERATURE: 98 F | WEIGHT: 158.3 LBS | OXYGEN SATURATION: 99 % | HEIGHT: 66 IN | DIASTOLIC BLOOD PRESSURE: 82 MMHG | HEART RATE: 61 BPM | SYSTOLIC BLOOD PRESSURE: 132 MMHG

## 2025-08-20 DIAGNOSIS — R49.0 HOARSENESS, PERSISTENT: Primary | ICD-10-CM

## 2025-08-20 DIAGNOSIS — R49.0 HOARSENESS, CHRONIC: ICD-10-CM

## 2025-08-20 PROCEDURE — 99214 OFFICE O/P EST MOD 30 MIN: CPT | Performed by: FAMILY MEDICINE

## 2025-08-20 PROCEDURE — 3075F SYST BP GE 130 - 139MM HG: CPT | Performed by: FAMILY MEDICINE

## 2025-08-20 PROCEDURE — 3079F DIAST BP 80-89 MM HG: CPT | Performed by: FAMILY MEDICINE
